# Patient Record
Sex: FEMALE | Race: WHITE | NOT HISPANIC OR LATINO | URBAN - METROPOLITAN AREA
[De-identification: names, ages, dates, MRNs, and addresses within clinical notes are randomized per-mention and may not be internally consistent; named-entity substitution may affect disease eponyms.]

---

## 2017-02-26 ENCOUNTER — EMERGENCY (EMERGENCY)
Facility: HOSPITAL | Age: 82
LOS: 1 days | End: 2017-02-26
Admitting: EMERGENCY MEDICINE
Payer: MEDICARE

## 2017-02-26 PROCEDURE — 99282 EMERGENCY DEPT VISIT SF MDM: CPT

## 2017-02-26 RX ORDER — NAFTIFINE HYDROCHLORIDE 10 MG/G
1 GEL TOPICAL
Qty: 1 | Refills: 0 | OUTPATIENT
Start: 2017-02-26 | End: 2017-03-12

## 2018-07-22 ENCOUNTER — INPATIENT (INPATIENT)
Facility: HOSPITAL | Age: 83
LOS: 4 days | Discharge: HOME HEALTH | End: 2018-07-27
Attending: FAMILY MEDICINE | Admitting: INTERNAL MEDICINE
Payer: MEDICARE

## 2018-07-22 VITALS
WEIGHT: 147.27 LBS | RESPIRATION RATE: 20 BRPM | HEART RATE: 71 BPM | SYSTOLIC BLOOD PRESSURE: 129 MMHG | OXYGEN SATURATION: 96 % | DIASTOLIC BLOOD PRESSURE: 73 MMHG | TEMPERATURE: 100 F | HEIGHT: 63 IN

## 2018-07-22 LAB
ALBUMIN SERPL ELPH-MCNC: 3.3 G/DL — SIGNIFICANT CHANGE UP (ref 3.3–5)
ALP SERPL-CCNC: 108 U/L — SIGNIFICANT CHANGE UP (ref 40–120)
ALT FLD-CCNC: 69 U/L — SIGNIFICANT CHANGE UP (ref 12–78)
ANION GAP SERPL CALC-SCNC: 12 MMOL/L — SIGNIFICANT CHANGE UP (ref 5–17)
APPEARANCE UR: CLEAR — SIGNIFICANT CHANGE UP
APTT BLD: 26.4 SEC — LOW (ref 27.5–37.4)
AST SERPL-CCNC: 72 U/L — HIGH (ref 15–37)
BACTERIA # UR AUTO: ABNORMAL
BASE EXCESS BLDV CALC-SCNC: -7.2 MMOL/L — LOW (ref -2–2)
BASOPHILS # BLD AUTO: 0 K/UL — SIGNIFICANT CHANGE UP (ref 0–0.2)
BASOPHILS NFR BLD AUTO: 0 % — SIGNIFICANT CHANGE UP (ref 0–2)
BILIRUB SERPL-MCNC: 1.3 MG/DL — HIGH (ref 0.2–1.2)
BILIRUB UR-MCNC: ABNORMAL
BUN SERPL-MCNC: 44 MG/DL — HIGH (ref 7–23)
CALCIUM SERPL-MCNC: 8.4 MG/DL — LOW (ref 8.5–10.1)
CHLORIDE SERPL-SCNC: 105 MMOL/L — SIGNIFICANT CHANGE UP (ref 96–108)
CO2 SERPL-SCNC: 21 MMOL/L — LOW (ref 22–31)
COLOR SPEC: YELLOW — SIGNIFICANT CHANGE UP
COMMENT - URINE: SIGNIFICANT CHANGE UP
CREAT SERPL-MCNC: 1.47 MG/DL — HIGH (ref 0.5–1.3)
DIFF PNL FLD: ABNORMAL
DOHLE BOD BLD QL SMEAR: PRESENT — SIGNIFICANT CHANGE UP
EOSINOPHIL # BLD AUTO: 0 K/UL — SIGNIFICANT CHANGE UP (ref 0–0.5)
EOSINOPHIL NFR BLD AUTO: 0 % — SIGNIFICANT CHANGE UP (ref 0–6)
EPI CELLS # UR: ABNORMAL
GLUCOSE SERPL-MCNC: 71 MG/DL — SIGNIFICANT CHANGE UP (ref 70–99)
GLUCOSE UR QL: NEGATIVE MG/DL — SIGNIFICANT CHANGE UP
HCO3 BLDV-SCNC: 17 MMOL/L — LOW (ref 21–29)
HCT VFR BLD CALC: 32.7 % — LOW (ref 34.5–45)
HGB BLD-MCNC: 10.9 G/DL — LOW (ref 11.5–15.5)
INR BLD: 1.38 RATIO — HIGH (ref 0.88–1.16)
KETONES UR-MCNC: NEGATIVE — SIGNIFICANT CHANGE UP
LACTATE SERPL-SCNC: 1.6 MMOL/L — SIGNIFICANT CHANGE UP (ref 0.7–2)
LEUKOCYTE ESTERASE UR-ACNC: ABNORMAL
LG PLATELETS BLD QL AUTO: SLIGHT — SIGNIFICANT CHANGE UP
LYMPHOCYTES # BLD AUTO: 1.72 K/UL — SIGNIFICANT CHANGE UP (ref 1–3.3)
LYMPHOCYTES # BLD AUTO: 10 % — LOW (ref 13–44)
MACROCYTES BLD QL: SLIGHT — SIGNIFICANT CHANGE UP
MANUAL SMEAR VERIFICATION: SIGNIFICANT CHANGE UP
MCHC RBC-ENTMCNC: 32.6 PG — SIGNIFICANT CHANGE UP (ref 27–34)
MCHC RBC-ENTMCNC: 33.3 GM/DL — SIGNIFICANT CHANGE UP (ref 32–36)
MCV RBC AUTO: 97.9 FL — SIGNIFICANT CHANGE UP (ref 80–100)
METAMYELOCYTES # FLD: 2 % — HIGH (ref 0–0)
MONOCYTES # BLD AUTO: 0.86 K/UL — SIGNIFICANT CHANGE UP (ref 0–0.9)
MONOCYTES NFR BLD AUTO: 5 % — SIGNIFICANT CHANGE UP (ref 2–14)
MYELOCYTES NFR BLD: 2 % — HIGH (ref 0–0)
NEUTROPHILS # BLD AUTO: 13.39 K/UL — HIGH (ref 1.8–7.4)
NEUTROPHILS NFR BLD AUTO: 74 % — SIGNIFICANT CHANGE UP (ref 43–77)
NEUTS BAND # BLD: 4 % — SIGNIFICANT CHANGE UP (ref 0–8)
NITRITE UR-MCNC: NEGATIVE — SIGNIFICANT CHANGE UP
NRBC # BLD: 0 /100 — SIGNIFICANT CHANGE UP (ref 0–0)
NRBC # BLD: SIGNIFICANT CHANGE UP /100 WBCS (ref 0–0)
PCO2 BLDV: 34 MMHG — LOW (ref 35–50)
PH BLDV: 7.33 — LOW (ref 7.35–7.45)
PH UR: 5 — SIGNIFICANT CHANGE UP (ref 5–8)
PLAT MORPH BLD: NORMAL — SIGNIFICANT CHANGE UP
PLATELET # BLD AUTO: 192 K/UL — SIGNIFICANT CHANGE UP (ref 150–400)
PO2 BLDV: 157 MMHG — HIGH (ref 25–45)
POTASSIUM SERPL-MCNC: 3.3 MMOL/L — LOW (ref 3.5–5.3)
POTASSIUM SERPL-SCNC: 3.3 MMOL/L — LOW (ref 3.5–5.3)
PROT SERPL-MCNC: 8.3 GM/DL — SIGNIFICANT CHANGE UP (ref 6–8.3)
PROT UR-MCNC: 30 MG/DL
PROTHROM AB SERPL-ACNC: 15 SEC — HIGH (ref 9.8–12.7)
RAPID RVP RESULT: SIGNIFICANT CHANGE UP
RBC # BLD: 3.34 M/UL — LOW (ref 3.8–5.2)
RBC # FLD: 14.1 % — SIGNIFICANT CHANGE UP (ref 10.3–14.5)
RBC BLD AUTO: SIGNIFICANT CHANGE UP
RBC CASTS # UR COMP ASSIST: ABNORMAL /HPF (ref 0–4)
SAO2 % BLDV: 99 % — HIGH (ref 67–88)
SODIUM SERPL-SCNC: 138 MMOL/L — SIGNIFICANT CHANGE UP (ref 135–145)
SP GR SPEC: 1.02 — SIGNIFICANT CHANGE UP (ref 1.01–1.02)
UROBILINOGEN FLD QL: 1 MG/DL
VARIANT LYMPHS # BLD: 3 % — SIGNIFICANT CHANGE UP (ref 0–6)
WBC # BLD: 17.17 K/UL — HIGH (ref 3.8–10.5)
WBC # FLD AUTO: 17.17 K/UL — HIGH (ref 3.8–10.5)
WBC UR QL: ABNORMAL

## 2018-07-22 PROCEDURE — 93010 ELECTROCARDIOGRAM REPORT: CPT

## 2018-07-22 PROCEDURE — 99291 CRITICAL CARE FIRST HOUR: CPT

## 2018-07-22 PROCEDURE — 71045 X-RAY EXAM CHEST 1 VIEW: CPT | Mod: 26

## 2018-07-22 PROCEDURE — 70450 CT HEAD/BRAIN W/O DYE: CPT | Mod: 26

## 2018-07-22 RX ORDER — REPAGLINIDE 1 MG/1
1 TABLET ORAL
Qty: 0 | Refills: 0 | COMMUNITY

## 2018-07-22 RX ORDER — CHOLECALCIFEROL (VITAMIN D3) 125 MCG
1 CAPSULE ORAL
Qty: 0 | Refills: 0 | COMMUNITY

## 2018-07-22 RX ORDER — ATORVASTATIN CALCIUM 80 MG/1
1 TABLET, FILM COATED ORAL
Qty: 0 | Refills: 0 | COMMUNITY

## 2018-07-22 RX ORDER — DONEPEZIL HYDROCHLORIDE 10 MG/1
1 TABLET, FILM COATED ORAL
Qty: 0 | Refills: 0 | COMMUNITY

## 2018-07-22 RX ORDER — RISPERIDONE 4 MG/1
1 TABLET ORAL
Qty: 0 | Refills: 0 | COMMUNITY

## 2018-07-22 RX ORDER — DOCUSATE SODIUM 100 MG
1 CAPSULE ORAL
Qty: 0 | Refills: 0 | COMMUNITY

## 2018-07-22 RX ORDER — ACETAMINOPHEN 500 MG
1000 TABLET ORAL ONCE
Qty: 0 | Refills: 0 | Status: COMPLETED | OUTPATIENT
Start: 2018-07-22 | End: 2018-07-22

## 2018-07-22 RX ORDER — LATANOPROST 0.05 MG/ML
1 SOLUTION/ DROPS OPHTHALMIC; TOPICAL
Qty: 0 | Refills: 0 | COMMUNITY

## 2018-07-22 RX ORDER — AZTREONAM 2 G
1000 VIAL (EA) INJECTION ONCE
Qty: 0 | Refills: 0 | Status: COMPLETED | OUTPATIENT
Start: 2018-07-22 | End: 2018-07-22

## 2018-07-22 RX ORDER — SERTRALINE 25 MG/1
1 TABLET, FILM COATED ORAL
Qty: 0 | Refills: 0 | COMMUNITY

## 2018-07-22 RX ORDER — DILTIAZEM HCL 120 MG
1 CAPSULE, EXT RELEASE 24 HR ORAL
Qty: 0 | Refills: 0 | COMMUNITY

## 2018-07-22 RX ORDER — SODIUM CHLORIDE 9 MG/ML
2000 INJECTION INTRAMUSCULAR; INTRAVENOUS; SUBCUTANEOUS ONCE
Qty: 0 | Refills: 0 | Status: COMPLETED | OUTPATIENT
Start: 2018-07-22 | End: 2018-07-22

## 2018-07-22 RX ORDER — ASPIRIN/CALCIUM CARB/MAGNESIUM 324 MG
1 TABLET ORAL
Qty: 0 | Refills: 0 | COMMUNITY

## 2018-07-22 RX ORDER — MEMANTINE HYDROCHLORIDE 10 MG/1
1 TABLET ORAL
Qty: 0 | Refills: 0 | COMMUNITY

## 2018-07-22 RX ORDER — VANCOMYCIN HCL 1 G
1000 VIAL (EA) INTRAVENOUS ONCE
Qty: 0 | Refills: 0 | Status: COMPLETED | OUTPATIENT
Start: 2018-07-22 | End: 2018-07-22

## 2018-07-22 RX ORDER — ACETAMINOPHEN 500 MG
2 TABLET ORAL
Qty: 0 | Refills: 0 | COMMUNITY

## 2018-07-22 RX ORDER — LEVOTHYROXINE SODIUM 125 MCG
1 TABLET ORAL
Qty: 0 | Refills: 0 | COMMUNITY

## 2018-07-22 RX ADMIN — SODIUM CHLORIDE 2000 MILLILITER(S): 9 INJECTION INTRAMUSCULAR; INTRAVENOUS; SUBCUTANEOUS at 15:26

## 2018-07-22 RX ADMIN — Medication 250 MILLIGRAM(S): at 15:37

## 2018-07-22 RX ADMIN — Medication 400 MILLIGRAM(S): at 15:25

## 2018-07-22 RX ADMIN — Medication 50 MILLIGRAM(S): at 17:50

## 2018-07-22 NOTE — ED PROVIDER NOTE - CARE PLAN
Principal Discharge DX:	Pneumonia  Secondary Diagnosis:	Altered mental status  Secondary Diagnosis:	Sepsis

## 2018-07-22 NOTE — H&P ADULT - FAMILY HISTORY
No pertinent family history in first degree relatives Mother  Still living? Unknown  Family history of CHF (congestive heart failure), Age at diagnosis: Age Unknown     Father  Still living? Unknown  Family history of CKD (chronic kidney disease), Age at diagnosis: Age Unknown

## 2018-07-22 NOTE — ED PROVIDER NOTE - PMH
Dementia    Diabetes    Dyslipidemia    Glaucoma    Hypertension    MI (myocardial infarction)    Skin cancer  recent Sx Lt shoulder  TIA (transient ischemic attack)

## 2018-07-22 NOTE — H&P ADULT - ASSESSMENT
89 y/o F PMHx significant for dementia, TIA, HTN, diabetes mellitus type 2, hyperlipidemia, who was BIBA from [facility] for further evaluation of increased confusion which began last night (7/21) associated with cough, and low grade fever today (7/22). At [facility] the patient was found to be increasingly dyspneic and hypoxic (per EMS SpO2 90% PTA). Labs => WBC 17.17, Hgb/Hct 10.9/32.7, K 3.3, BUN/Cr 44/1.47. CT Head => moderate anterior periventricular white matter ischemia. CXR revealed bilateral lower lobe infiltrates. In the ED the patient was found to be in AFib w/ RVR. 89 y/o F PMHx significant for dementia, TIA, HTN, diabetes mellitus type 2, hyperlipidemia, who was BIBA from [facility] for further evaluation of increased confusion which began last night (7/21) associated with cough, and low grade fever today (7/22). At [facility] the patient was found to be increasingly dyspneic and hypoxic (per EMS SpO2 90% PTA). Labs => WBC 17.17, Hgb/Hct 10.9/32.7, K 3.3, BUN/Cr 44/1.47. CT Head => moderate anterior periventricular white matter ischemia. CXR revealed bilateral lower lobe infiltrates. In the ED the patient was found to be in AFib w/ RVR.    1)Sepsis due to HCAP  ~admit to CICU  ~cont. broad spectrum IV abx  ~f/u PAN C+S  ~f/u w/ ID consultation in the am  ~cont. supplemental O2  ~cont. anti-pyretics prn  ~cont. anti-tussives prn    2)AFib w/ RVR  ~cont. rate control  ~given LMWH @ 1mg/kg q24h (2/2 renal fxn)  ~serial Rod/EKGs  ~f/u w/ Cardiology in the am    3)CAD/HTN  ~cont. ASA 81mg po daily  ~cont. Diltiazem when appropriate    4)Hyperlipidemia  ~cont. statin therapy     5)Dementia  ~cont. Donepezil 10mg po qHS  ~cont. Memantine 10mg po bid    6)Advance Care Planning  ~advance directives were discussed extensively at the patient's bedside. The patient wishes at this time are to be both DNR. The following medical interventions are agreeable to the patient including use of IV fluids, and antibiotics.   Total ACP time spent; 16 minutes    7)Vte ppx  ~cont. LMWH as above 89 y/o F PMHx significant for dementia, TIA, HTN, diabetes mellitus type 2, hyperlipidemia, who was BIBA from [facility] for further evaluation of increased confusion which began last night (7/21) associated with cough, and low grade fever today (7/22). At [facility] the patient was found to be increasingly dyspneic and hypoxic (per EMS SpO2 90% PTA). Labs => WBC 17.17, Hgb/Hct 10.9/32.7, K 3.3, BUN/Cr 44/1.47. CT Head => moderate anterior periventricular white matter ischemia. CXR revealed bilateral lower lobe infiltrates. In the ED the patient was found to be in AFib w/ RVR.    1)Sepsis due to HCAP  ~admit to CICU  ~cont. broad spectrum IV abx  ~f/u PAN C+S  ~f/u w/ ID consultation in the am  ~cont. supplemental O2  ~cont. anti-pyretics prn  ~cont. anti-tussives prn    2)AFib w/ RVR  ~cont. rate control  ~given LMWH @ 1mg/kg q24h (2/2 renal fxn)  ~serial Rod/EKGs  ~f/u w/ Cardiology in the am    3)CAD/HTN  ~cont. ASA 81mg po daily  ~cont. Diltiazem when appropriate    4)Hyperlipidemia  ~cont. statin therapy     5)Dementia  ~cont. Donepezil 10mg po qHS  ~cont. Memantine 10mg po bid    6)Advance Care Planning  ~advance directives were discussed extensively at the patient's bedside. The patient wishes at this time are to be both DNR. The following medical interventions are agreeable to the patient including use of IV fluids, and antibiotics.   Total ACP time spent; 16 minutes    7)Vte ppx  ~cont. LMWH as above    Total Critical Care Time; 100 minutes.

## 2018-07-22 NOTE — ED ADULT NURSE NOTE - OBJECTIVE STATEMENT
pt brought by EMS from SNF for eval of fever and lethargy since today. pt awake denies pain or diff breathing at this time c/o productive cough.

## 2018-07-22 NOTE — ED PROVIDER NOTE - PROGRESS NOTE DETAILS
EZIO PT septic.  No sign of septic shock.  Lactate 1.6.   Pt Tx according to protocol.  Source likely urinary.  Given presentation and findings, patient requires hospitalization likely requiring 4 days of inpatient care.  Patient signed out to Dr. Vásquez.  Patient's history, vital signs, lab results, imaging, pertinent findings, and clinical condition reviewed during sign out.  At sign out patient admitted in hemodynamically stable condition in no acute distress.

## 2018-07-22 NOTE — ED PROVIDER NOTE - OBJECTIVE STATEMENT
89 y/o female with a PMHx of dementia, dyslipidemia, DM, CAD, glaucoma, HTN, MI, skin CA, TIA presents to the ED c/o fever and cough starting last night. As per family, she noticed the pt with an altered mental status last night, describes the pt as being "incoherent" and had onset of cough, worsening today. Pt had low grade fever today. As per EMS, pt was 90% on room air PTA, now improving. Denies recent fall. Denies ETOH, drug or tobacco use. On baby ASA. Allergy to Penicillin and Sulfa. Full hx unobtainable due to dementia,   spesis, pulmoarny soue,ce evaulated according to spesis protocol   crysat shalini, droway appearing 89 y/o female with a PMHx of dementia, dyslipidemia, DM, CAD, glaucoma, HTN, MI, skin CA, TIA presents to the ED c/o fever and cough starting last night. As per family, she noticed the pt with an altered mental status last night, describes the pt as being "incoherent" and had onset of cough, worsening today. Pt had low grade fever today. As per EMS, pt was 90% on room air PTA, now improving. Denies recent fall. Denies ETOH, drug or tobacco use. On baby ASA. Allergy to Penicillin and Sulfa. Full hx unobtainable due to dementia.

## 2018-07-22 NOTE — ED PROVIDER NOTE - MEDICAL DECISION MAKING DETAILS
89 y/o female presents spesis, pulmoardenis martin,ce evaulated according to spesis protocol 89 y/o female presents with hx of dementia, DM, CAD, skin CA, MI presents with fever, cough and AMS starting last night. On exam, pt noted to have bilateral rhonchi. Plan for sepsis workup, r/o pulmonary source, CXR, CT head, EKG, evaluated according to speiss protocol.

## 2018-07-22 NOTE — ED PROVIDER NOTE - FAMILY HISTORY
Mother  Still living? Unknown  Family history of CHF (congestive heart failure), Age at diagnosis: Age Unknown     Father  Still living? Unknown  Family history of CKD (chronic kidney disease), Age at diagnosis: Age Unknown

## 2018-07-22 NOTE — H&P ADULT - NSHPPHYSICALEXAM_GEN_ALL_CORE
Vital Signs Last 24 Hrs  T(C): 36.7 (22 Jul 2018 19:43), Max: 38.1 (22 Jul 2018 15:22)  T(F): 98.1 (22 Jul 2018 19:43), Max: 100.5 (22 Jul 2018 15:22)  HR: 94 (22 Jul 2018 19:43) (61 - 94)  BP: 102/61 (22 Jul 2018 19:43) (102/61 - 129/73)  RR: 22 (22 Jul 2018 19:43) (20 - 22)  SpO2: 94% (22 Jul 2018 19:43) (94% - 97%)

## 2018-07-22 NOTE — H&P ADULT - HISTORY OF PRESENT ILLNESS
89 y/o F PMHx significant for dementia, TIA, HTN, diabetes mellitus type 2, hyperlipidemia, who was BIBA from [facility] for further evaluation of increased confusion which began last night (7/21) associated with cough, and low grade fever today (7/22). At [facility] the patient was found to be increasingly dyspneic and hypoxic (per EMS SpO2 90% PTA). Labs => WBC 17.17, Hgb/Hct 10.9/32.7, K 3.3, BUN/Cr 44/1.47. CT Head => moderate anterior periventricular white matter ischemia. CXR revealed bilateral lower lobe infiltrates. In the ED the patient was found to be in AFib w/ RVR.

## 2018-07-22 NOTE — ED PROVIDER NOTE - CONSTITUTIONAL, MLM
normal... Well appearing, well nourished, awake, alert, oriented to person, place, time/situation and in no apparent distress. Drowsy appearing, well nourished, awake, alert, and in no apparent distress.

## 2018-07-22 NOTE — ED ADULT NURSE REASSESSMENT NOTE - COMFORT CARE
repositioned/wait time explained/assisted to bedpan/side rails up/warm blanket provided/linens changed/plan of care explained

## 2018-07-22 NOTE — H&P ADULT - PMH
Dementia    Diabetes    Dyslipidemia    Glaucoma    Hypertension    MI (myocardial infarction)    Skin cancer  recent Sx Lt shoulder  TIA (transient ischemic attack) Dementia    Diabetes  Type 2  Dyslipidemia    Glaucoma    Hypertension    MI (myocardial infarction)    Skin cancer  recent Sx Lt shoulder  TIA (transient ischemic attack)

## 2018-07-22 NOTE — ED PROVIDER NOTE - NS_ ATTENDINGSCRIBEDETAILS _ED_A_ED_FT
The scribe's documentation has been prepared under my direction and personally reviewed by me in its entirety.  I confirm that the note above accurately reflects all my work, treatment, procedures, and decision making except where otherwise noted or amended by me.  Дмитрий Ham M.D.

## 2018-07-23 LAB
ADD ON TEST-SPECIMEN IN LAB: SIGNIFICANT CHANGE UP
ALBUMIN SERPL ELPH-MCNC: 2.5 G/DL — LOW (ref 3.3–5)
ALP SERPL-CCNC: 107 U/L — SIGNIFICANT CHANGE UP (ref 40–120)
ALT FLD-CCNC: 68 U/L — SIGNIFICANT CHANGE UP (ref 12–78)
ANION GAP SERPL CALC-SCNC: 9 MMOL/L — SIGNIFICANT CHANGE UP (ref 5–17)
AST SERPL-CCNC: 79 U/L — HIGH (ref 15–37)
BASOPHILS # BLD AUTO: 0 K/UL — SIGNIFICANT CHANGE UP (ref 0–0.2)
BASOPHILS NFR BLD AUTO: 0 % — SIGNIFICANT CHANGE UP (ref 0–2)
BILIRUB SERPL-MCNC: 1.1 MG/DL — SIGNIFICANT CHANGE UP (ref 0.2–1.2)
BUN SERPL-MCNC: 32 MG/DL — HIGH (ref 7–23)
CALCIUM SERPL-MCNC: 7.5 MG/DL — LOW (ref 8.5–10.1)
CHLORIDE SERPL-SCNC: 109 MMOL/L — HIGH (ref 96–108)
CO2 SERPL-SCNC: 19 MMOL/L — LOW (ref 22–31)
CREAT SERPL-MCNC: 0.98 MG/DL — SIGNIFICANT CHANGE UP (ref 0.5–1.3)
CULTURE RESULTS: SIGNIFICANT CHANGE UP
EOSINOPHIL # BLD AUTO: 0 K/UL — SIGNIFICANT CHANGE UP (ref 0–0.5)
EOSINOPHIL NFR BLD AUTO: 0 % — SIGNIFICANT CHANGE UP (ref 0–6)
GLUCOSE BLDC GLUCOMTR-MCNC: 103 MG/DL — HIGH (ref 70–99)
GLUCOSE BLDC GLUCOMTR-MCNC: 114 MG/DL — HIGH (ref 70–99)
GLUCOSE BLDC GLUCOMTR-MCNC: 80 MG/DL — SIGNIFICANT CHANGE UP (ref 70–99)
GLUCOSE BLDC GLUCOMTR-MCNC: 88 MG/DL — SIGNIFICANT CHANGE UP (ref 70–99)
GLUCOSE SERPL-MCNC: 77 MG/DL — SIGNIFICANT CHANGE UP (ref 70–99)
HBA1C BLD-MCNC: 6.3 % — HIGH (ref 4–5.6)
HCT VFR BLD CALC: 27.6 % — LOW (ref 34.5–45)
HGB BLD-MCNC: 9.2 G/DL — LOW (ref 11.5–15.5)
INR BLD: 1.61 RATIO — HIGH (ref 0.88–1.16)
LACTATE SERPL-SCNC: 0.8 MMOL/L — SIGNIFICANT CHANGE UP (ref 0.7–2)
LYMPHOCYTES # BLD AUTO: 1.29 K/UL — SIGNIFICANT CHANGE UP (ref 1–3.3)
LYMPHOCYTES # BLD AUTO: 11 % — LOW (ref 13–44)
MAGNESIUM SERPL-MCNC: 2.2 MG/DL — SIGNIFICANT CHANGE UP (ref 1.6–2.6)
MAGNESIUM SERPL-MCNC: 2.2 MG/DL — SIGNIFICANT CHANGE UP (ref 1.6–2.6)
MANUAL SMEAR VERIFICATION: SIGNIFICANT CHANGE UP
MCHC RBC-ENTMCNC: 32.4 PG — SIGNIFICANT CHANGE UP (ref 27–34)
MCHC RBC-ENTMCNC: 33.3 GM/DL — SIGNIFICANT CHANGE UP (ref 32–36)
MCV RBC AUTO: 97.2 FL — SIGNIFICANT CHANGE UP (ref 80–100)
MONOCYTES # BLD AUTO: 0.7 K/UL — SIGNIFICANT CHANGE UP (ref 0–0.9)
MONOCYTES NFR BLD AUTO: 6 % — SIGNIFICANT CHANGE UP (ref 2–14)
NEUTROPHILS # BLD AUTO: 9.7 K/UL — HIGH (ref 1.8–7.4)
NEUTROPHILS NFR BLD AUTO: 83 % — HIGH (ref 43–77)
NRBC # BLD: 0 /100 — SIGNIFICANT CHANGE UP (ref 0–0)
NRBC # BLD: SIGNIFICANT CHANGE UP /100 WBCS (ref 0–0)
PLAT MORPH BLD: NORMAL — SIGNIFICANT CHANGE UP
PLATELET # BLD AUTO: 176 K/UL — SIGNIFICANT CHANGE UP (ref 150–400)
POTASSIUM SERPL-MCNC: 3.6 MMOL/L — SIGNIFICANT CHANGE UP (ref 3.5–5.3)
POTASSIUM SERPL-SCNC: 3.6 MMOL/L — SIGNIFICANT CHANGE UP (ref 3.5–5.3)
PROT SERPL-MCNC: 6.5 GM/DL — SIGNIFICANT CHANGE UP (ref 6–8.3)
PROTHROM AB SERPL-ACNC: 17.6 SEC — HIGH (ref 9.8–12.7)
RBC # BLD: 2.84 M/UL — LOW (ref 3.8–5.2)
RBC # FLD: 14.1 % — SIGNIFICANT CHANGE UP (ref 10.3–14.5)
RBC BLD AUTO: SIGNIFICANT CHANGE UP
SODIUM SERPL-SCNC: 137 MMOL/L — SIGNIFICANT CHANGE UP (ref 135–145)
SPECIMEN SOURCE: SIGNIFICANT CHANGE UP
TROPONIN I SERPL-MCNC: 0.05 NG/ML — HIGH (ref 0.01–0.04)
TROPONIN I SERPL-MCNC: 0.05 NG/ML — HIGH (ref 0.01–0.04)
TSH SERPL-MCNC: 0.82 UIU/ML — SIGNIFICANT CHANGE UP (ref 0.36–3.74)
WBC # BLD: 11.69 K/UL — HIGH (ref 3.8–10.5)
WBC # FLD AUTO: 11.69 K/UL — HIGH (ref 3.8–10.5)

## 2018-07-23 PROCEDURE — 93010 ELECTROCARDIOGRAM REPORT: CPT

## 2018-07-23 PROCEDURE — 71045 X-RAY EXAM CHEST 1 VIEW: CPT | Mod: 26

## 2018-07-23 PROCEDURE — 93306 TTE W/DOPPLER COMPLETE: CPT | Mod: 26

## 2018-07-23 RX ORDER — ONDANSETRON 8 MG/1
4 TABLET, FILM COATED ORAL EVERY 6 HOURS
Qty: 0 | Refills: 0 | Status: DISCONTINUED | OUTPATIENT
Start: 2018-07-23 | End: 2018-07-27

## 2018-07-23 RX ORDER — DEXTROSE 50 % IN WATER 50 %
15 SYRINGE (ML) INTRAVENOUS ONCE
Qty: 0 | Refills: 0 | Status: DISCONTINUED | OUTPATIENT
Start: 2018-07-23 | End: 2018-07-27

## 2018-07-23 RX ORDER — WARFARIN SODIUM 2.5 MG/1
3 TABLET ORAL ONCE
Qty: 0 | Refills: 0 | Status: COMPLETED | OUTPATIENT
Start: 2018-07-23 | End: 2018-07-23

## 2018-07-23 RX ORDER — ASPIRIN/CALCIUM CARB/MAGNESIUM 324 MG
81 TABLET ORAL DAILY
Qty: 0 | Refills: 0 | Status: DISCONTINUED | OUTPATIENT
Start: 2018-07-23 | End: 2018-07-27

## 2018-07-23 RX ORDER — WARFARIN SODIUM 2.5 MG/1
5 TABLET ORAL DAILY
Qty: 0 | Refills: 0 | Status: DISCONTINUED | OUTPATIENT
Start: 2018-07-23 | End: 2018-07-23

## 2018-07-23 RX ORDER — SENNA PLUS 8.6 MG/1
2 TABLET ORAL AT BEDTIME
Qty: 0 | Refills: 0 | Status: DISCONTINUED | OUTPATIENT
Start: 2018-07-23 | End: 2018-07-27

## 2018-07-23 RX ORDER — SODIUM CHLORIDE 9 MG/ML
1000 INJECTION INTRAMUSCULAR; INTRAVENOUS; SUBCUTANEOUS
Qty: 0 | Refills: 0 | Status: DISCONTINUED | OUTPATIENT
Start: 2018-07-23 | End: 2018-07-24

## 2018-07-23 RX ORDER — SODIUM CHLORIDE 9 MG/ML
1000 INJECTION, SOLUTION INTRAVENOUS
Qty: 0 | Refills: 0 | Status: DISCONTINUED | OUTPATIENT
Start: 2018-07-23 | End: 2018-07-27

## 2018-07-23 RX ORDER — DONEPEZIL HYDROCHLORIDE 10 MG/1
10 TABLET, FILM COATED ORAL AT BEDTIME
Qty: 0 | Refills: 0 | Status: DISCONTINUED | OUTPATIENT
Start: 2018-07-23 | End: 2018-07-27

## 2018-07-23 RX ORDER — DOCUSATE SODIUM 100 MG
100 CAPSULE ORAL THREE TIMES A DAY
Qty: 0 | Refills: 0 | Status: DISCONTINUED | OUTPATIENT
Start: 2018-07-23 | End: 2018-07-27

## 2018-07-23 RX ORDER — HEPARIN SODIUM 5000 [USP'U]/ML
5500 INJECTION INTRAVENOUS; SUBCUTANEOUS EVERY 6 HOURS
Qty: 0 | Refills: 0 | Status: DISCONTINUED | OUTPATIENT
Start: 2018-07-23 | End: 2018-07-23

## 2018-07-23 RX ORDER — LEVOTHYROXINE SODIUM 125 MCG
25 TABLET ORAL DAILY
Qty: 0 | Refills: 0 | Status: DISCONTINUED | OUTPATIENT
Start: 2018-07-23 | End: 2018-07-27

## 2018-07-23 RX ORDER — AZTREONAM 2 G
VIAL (EA) INJECTION
Qty: 0 | Refills: 0 | Status: DISCONTINUED | OUTPATIENT
Start: 2018-07-23 | End: 2018-07-23

## 2018-07-23 RX ORDER — FUROSEMIDE 40 MG
40 TABLET ORAL ONCE
Qty: 0 | Refills: 0 | Status: COMPLETED | OUTPATIENT
Start: 2018-07-23 | End: 2018-07-23

## 2018-07-23 RX ORDER — HEPARIN SODIUM 5000 [USP'U]/ML
2500 INJECTION INTRAVENOUS; SUBCUTANEOUS EVERY 6 HOURS
Qty: 0 | Refills: 0 | Status: DISCONTINUED | OUTPATIENT
Start: 2018-07-23 | End: 2018-07-23

## 2018-07-23 RX ORDER — DEXTROSE 50 % IN WATER 50 %
12.5 SYRINGE (ML) INTRAVENOUS ONCE
Qty: 0 | Refills: 0 | Status: DISCONTINUED | OUTPATIENT
Start: 2018-07-23 | End: 2018-07-27

## 2018-07-23 RX ORDER — RISPERIDONE 4 MG/1
0.25 TABLET ORAL DAILY
Qty: 0 | Refills: 0 | Status: DISCONTINUED | OUTPATIENT
Start: 2018-07-23 | End: 2018-07-27

## 2018-07-23 RX ORDER — GLUCAGON INJECTION, SOLUTION 0.5 MG/.1ML
1 INJECTION, SOLUTION SUBCUTANEOUS ONCE
Qty: 0 | Refills: 0 | Status: DISCONTINUED | OUTPATIENT
Start: 2018-07-23 | End: 2018-07-27

## 2018-07-23 RX ORDER — ALBUTEROL 90 UG/1
2.5 AEROSOL, METERED ORAL EVERY 6 HOURS
Qty: 0 | Refills: 0 | Status: DISCONTINUED | OUTPATIENT
Start: 2018-07-23 | End: 2018-07-27

## 2018-07-23 RX ORDER — DILTIAZEM HCL 120 MG
120 CAPSULE, EXT RELEASE 24 HR ORAL DAILY
Qty: 0 | Refills: 0 | Status: DISCONTINUED | OUTPATIENT
Start: 2018-07-23 | End: 2018-07-27

## 2018-07-23 RX ORDER — HEPARIN SODIUM 5000 [USP'U]/ML
INJECTION INTRAVENOUS; SUBCUTANEOUS
Qty: 25000 | Refills: 0 | Status: DISCONTINUED | OUTPATIENT
Start: 2018-07-23 | End: 2018-07-23

## 2018-07-23 RX ORDER — ACETAMINOPHEN 500 MG
650 TABLET ORAL EVERY 6 HOURS
Qty: 0 | Refills: 0 | Status: DISCONTINUED | OUTPATIENT
Start: 2018-07-23 | End: 2018-07-27

## 2018-07-23 RX ORDER — CEFEPIME 1 G/1
1000 INJECTION, POWDER, FOR SOLUTION INTRAMUSCULAR; INTRAVENOUS EVERY 12 HOURS
Qty: 0 | Refills: 0 | Status: DISCONTINUED | OUTPATIENT
Start: 2018-07-23 | End: 2018-07-26

## 2018-07-23 RX ORDER — DEXTROSE 50 % IN WATER 50 %
25 SYRINGE (ML) INTRAVENOUS ONCE
Qty: 0 | Refills: 0 | Status: DISCONTINUED | OUTPATIENT
Start: 2018-07-23 | End: 2018-07-27

## 2018-07-23 RX ORDER — LATANOPROST 0.05 MG/ML
1 SOLUTION/ DROPS OPHTHALMIC; TOPICAL AT BEDTIME
Qty: 0 | Refills: 0 | Status: DISCONTINUED | OUTPATIENT
Start: 2018-07-23 | End: 2018-07-27

## 2018-07-23 RX ORDER — POTASSIUM CHLORIDE 20 MEQ
10 PACKET (EA) ORAL ONCE
Qty: 0 | Refills: 0 | Status: COMPLETED | OUTPATIENT
Start: 2018-07-23 | End: 2018-07-23

## 2018-07-23 RX ORDER — ACETAMINOPHEN 500 MG
1000 TABLET ORAL ONCE
Qty: 0 | Refills: 0 | Status: COMPLETED | OUTPATIENT
Start: 2018-07-23 | End: 2018-07-23

## 2018-07-23 RX ORDER — SERTRALINE 25 MG/1
100 TABLET, FILM COATED ORAL DAILY
Qty: 0 | Refills: 0 | Status: DISCONTINUED | OUTPATIENT
Start: 2018-07-23 | End: 2018-07-27

## 2018-07-23 RX ORDER — CHOLECALCIFEROL (VITAMIN D3) 125 MCG
1000 CAPSULE ORAL DAILY
Qty: 0 | Refills: 0 | Status: DISCONTINUED | OUTPATIENT
Start: 2018-07-23 | End: 2018-07-27

## 2018-07-23 RX ORDER — HEPARIN SODIUM 5000 [USP'U]/ML
5500 INJECTION INTRAVENOUS; SUBCUTANEOUS ONCE
Qty: 0 | Refills: 0 | Status: DISCONTINUED | OUTPATIENT
Start: 2018-07-23 | End: 2018-07-23

## 2018-07-23 RX ORDER — MEMANTINE HYDROCHLORIDE 10 MG/1
10 TABLET ORAL
Qty: 0 | Refills: 0 | Status: DISCONTINUED | OUTPATIENT
Start: 2018-07-23 | End: 2018-07-27

## 2018-07-23 RX ORDER — ATORVASTATIN CALCIUM 80 MG/1
10 TABLET, FILM COATED ORAL AT BEDTIME
Qty: 0 | Refills: 0 | Status: DISCONTINUED | OUTPATIENT
Start: 2018-07-23 | End: 2018-07-27

## 2018-07-23 RX ORDER — RISPERIDONE 4 MG/1
0.5 TABLET ORAL AT BEDTIME
Qty: 0 | Refills: 0 | Status: DISCONTINUED | OUTPATIENT
Start: 2018-07-23 | End: 2018-07-27

## 2018-07-23 RX ORDER — ENOXAPARIN SODIUM 100 MG/ML
60 INJECTION SUBCUTANEOUS EVERY 24 HOURS
Qty: 0 | Refills: 0 | Status: DISCONTINUED | OUTPATIENT
Start: 2018-07-23 | End: 2018-07-27

## 2018-07-23 RX ORDER — AZTREONAM 2 G
1000 VIAL (EA) INJECTION ONCE
Qty: 0 | Refills: 0 | Status: COMPLETED | OUTPATIENT
Start: 2018-07-23 | End: 2018-07-23

## 2018-07-23 RX ORDER — INSULIN LISPRO 100/ML
VIAL (ML) SUBCUTANEOUS
Qty: 0 | Refills: 0 | Status: DISCONTINUED | OUTPATIENT
Start: 2018-07-23 | End: 2018-07-27

## 2018-07-23 RX ORDER — AZTREONAM 2 G
1000 VIAL (EA) INJECTION EVERY 8 HOURS
Qty: 0 | Refills: 0 | Status: DISCONTINUED | OUTPATIENT
Start: 2018-07-23 | End: 2018-07-23

## 2018-07-23 RX ADMIN — WARFARIN SODIUM 3 MILLIGRAM(S): 2.5 TABLET ORAL at 21:30

## 2018-07-23 RX ADMIN — RISPERIDONE 0.5 MILLIGRAM(S): 4 TABLET ORAL at 21:31

## 2018-07-23 RX ADMIN — Medication 50 MILLIGRAM(S): at 03:44

## 2018-07-23 RX ADMIN — ATORVASTATIN CALCIUM 10 MILLIGRAM(S): 80 TABLET, FILM COATED ORAL at 21:30

## 2018-07-23 RX ADMIN — Medication 1000 UNIT(S): at 12:25

## 2018-07-23 RX ADMIN — Medication 120 MILLIGRAM(S): at 10:05

## 2018-07-23 RX ADMIN — MEMANTINE HYDROCHLORIDE 10 MILLIGRAM(S): 10 TABLET ORAL at 06:14

## 2018-07-23 RX ADMIN — Medication 100 MILLIGRAM(S): at 17:14

## 2018-07-23 RX ADMIN — Medication 200 MILLIGRAM(S): at 10:09

## 2018-07-23 RX ADMIN — RISPERIDONE 0.25 MILLIGRAM(S): 4 TABLET ORAL at 12:25

## 2018-07-23 RX ADMIN — DONEPEZIL HYDROCHLORIDE 10 MILLIGRAM(S): 10 TABLET, FILM COATED ORAL at 21:30

## 2018-07-23 RX ADMIN — Medication 1000 MILLIGRAM(S): at 01:33

## 2018-07-23 RX ADMIN — Medication 40 MILLIGRAM(S): at 17:13

## 2018-07-23 RX ADMIN — LATANOPROST 1 DROP(S): 0.05 SOLUTION/ DROPS OPHTHALMIC; TOPICAL at 22:24

## 2018-07-23 RX ADMIN — Medication 25 MICROGRAM(S): at 06:14

## 2018-07-23 RX ADMIN — Medication 200 MILLIGRAM(S): at 01:23

## 2018-07-23 RX ADMIN — SODIUM CHLORIDE 150 MILLILITER(S): 9 INJECTION INTRAMUSCULAR; INTRAVENOUS; SUBCUTANEOUS at 09:00

## 2018-07-23 RX ADMIN — MEMANTINE HYDROCHLORIDE 10 MILLIGRAM(S): 10 TABLET ORAL at 17:14

## 2018-07-23 RX ADMIN — CEFEPIME 1000 MILLIGRAM(S): 1 INJECTION, POWDER, FOR SOLUTION INTRAMUSCULAR; INTRAVENOUS at 17:14

## 2018-07-23 RX ADMIN — Medication 100 MILLIEQUIVALENT(S): at 04:39

## 2018-07-23 RX ADMIN — Medication 100 MILLIGRAM(S): at 21:30

## 2018-07-23 RX ADMIN — SODIUM CHLORIDE 150 MILLILITER(S): 9 INJECTION INTRAMUSCULAR; INTRAVENOUS; SUBCUTANEOUS at 01:23

## 2018-07-23 RX ADMIN — Medication 81 MILLIGRAM(S): at 12:25

## 2018-07-23 RX ADMIN — ENOXAPARIN SODIUM 60 MILLIGRAM(S): 100 INJECTION SUBCUTANEOUS at 06:14

## 2018-07-23 RX ADMIN — ALBUTEROL 2.5 MILLIGRAM(S): 90 AEROSOL, METERED ORAL at 20:29

## 2018-07-23 RX ADMIN — SERTRALINE 100 MILLIGRAM(S): 25 TABLET, FILM COATED ORAL at 12:25

## 2018-07-23 NOTE — ED ADULT NURSE REASSESSMENT NOTE - COMFORT CARE
plan of care explained/side rails up/wait time explained diaper changed/plan of care explained/side rails up/wait time explained

## 2018-07-23 NOTE — CONSULT NOTE ADULT - ASSESSMENT
89 y/o F PMHx significant for dementia, TIA, HTN, diabetes mellitus type 2, hyperlipidemia, recent hospitalization at [facility] for dizziness, admitted from snf on 7/22 for evaluation of increased shortness of breath, cough and confusion; upon admission the patient was found to be in rapid afib. History per medical record and family at bedside as patient is poor historian.  1. Patient admitted with pneumonia which will consider as health care associated pneumonia given recent hospitalization and transfer from snf  - patient at risk for gram negative rods and other resistant bacteria   - oxygen and nebs as needed   - serial cbc and monitor temperature   - reviewed prior medical records to evaluate for resistant or atypical pathogens   - will optimize antibiotics to cefepime which patient should tolerate given allergy to penicillin was from many years ago and most old allergies are due to cogeners in old formulations  - will add doxycycline to cover atypical and resistant bacteria  2. other issues; dementia, TIA, HTN, diabetes mellitus type 2, hyperlipidemia  - per medicine 89 y/o F PMHx significant for dementia, TIA, HTN, diabetes mellitus type 2, hyperlipidemia, recent hospitalization at [facility] for dizziness, admitted from snf on 7/22 for evaluation of increased shortness of breath, cough and confusion; upon admission the patient was found to be in rapid afib. History per medical record and family at bedside as patient is poor historian.  1. Patient admitted with pneumonia which will consider as health care associated pneumonia given recent hospitalization and transfer from snf; also noted with leukocytosis most likely reactive to pneumonia  - patient at risk for gram negative rods and other resistant bacteria   - oxygen and nebs as needed   - serial cbc and monitor temperature   - reviewed prior medical records to evaluate for resistant or atypical pathogens   - will optimize antibiotics to cefepime which patient should tolerate given allergy to penicillin was from many years ago and most old allergies are due to cogeners in old formulations  - will add doxycycline to cover atypical and resistant bacteria  2. other issues; dementia, TIA, HTN, diabetes mellitus type 2, hyperlipidemia  - per medicine

## 2018-07-23 NOTE — ED ADULT NURSE REASSESSMENT NOTE - NS ED NURSE REASSESS COMMENT FT1
diaper changed at this time
Patient heart rate noted to be irregular and fast in 140s, dr Valera notified. EKG done, orders received. will continue to monitor.
Assumed care of patient from LUPIS Tai. Patient resting comfortably in bed, Safety and comfort maintained. Will continue to monitor,

## 2018-07-23 NOTE — CONSULT NOTE ADULT - ASSESSMENT
SOB- likely secondary to pneumonia.  Fever and leucocytosis.  Management pre primary team.   she does not appear volume overloaded grossly.    Ejection systolic murmur- check 2 D echo to asses for aortic stenosis.    hypotension- resolved with IVF.   Likely from sepsis.    Other medical issues- Management per primary team.   Thank you for allowing me to participate in the care of this patient. Please feel free to contact me with any questions.

## 2018-07-23 NOTE — PROGRESS NOTE ADULT - SUBJECTIVE AND OBJECTIVE BOX
Patient is a 89y old  Female who presents with a chief complaint of Shortness of Breath       SUBJECTIVE:   HPI:  89 y/o F PMHx significant for dementia, TIA, HTN, diabetes mellitus type 2, hyperlipidemia, who was BIBA from [facility] for further evaluation of increased confusion which began last night (7/21) associated with cough, and low grade fever today (7/22). At [facility] the patient was found to be increasingly dyspneic and hypoxic (per EMS SpO2 90% PTA). Labs => WBC 17.17, Hgb/Hct 10.9/32.7, K 3.3, BUN/Cr 44/1.47. CT Head => moderate anterior periventricular white matter ischemia. CXR revealed bilateral lower lobe infiltrates. In the ED the patient was found to be in AFib w/ RVR.      7/23: pt now in NSR, appears dyspneic able to follow commands and answer questions but hypoxic to 88 % on RA. diffuse audible wheezing noted in B/L lung fieldsPT received 150ml NS since admission for sepsis protocol        REVIEW OF SYSTEMS:    CONSTITUTIONAL: No weakness, fevers or chills  EYES/ENT: No visual changes;  No vertigo or throat pain   NECK: No pain or stiffness  RESPIRATORY: No cough, wheezing, hemoptysis; No shortness of breath  CARDIOVASCULAR: No chest pain or palpitations  GASTROINTESTINAL: No abdominal or epigastric pain. No nausea, vomiting, or hematemesis; No diarrhea or constipation. No melena or hematochezia.  GENITOURINARY: No dysuria, frequency or hematuria  NEUROLOGICAL: No numbness or weakness  SKIN: No itching, burning, rashes, or lesions   All other review of systems is negative unless indicated above        ICU Vital Signs Last 24 Hrs  T(C): 36.8 (23 Jul 2018 08:00), Max: 39.2 (23 Jul 2018 00:53)  T(F): 98.2 (23 Jul 2018 08:00), Max: 102.6 (23 Jul 2018 00:53)  HR: 72 (23 Jul 2018 13:00) (61 - 143)  BP: 122/42 (23 Jul 2018 13:00) (97/44 - 128/46)  BP(mean): 62 (23 Jul 2018 13:00) (57 - 91)  ABP: --  ABP(mean): --  RR: 25 (23 Jul 2018 13:00) (19 - 25)  SpO2: 95% (23 Jul 2018 13:00) (93% - 98%)              PHYSICAL EXAM:    Constitutional: NAD, awake and alert, well-developed  HEENT: PERR, EOMI, Normal Hearing, MMM  Neck: Soft and supple, No LAD, No JVD  Respiratory: diffuse wheezing and rales   Cardiovascular: S1 and S2, regular rate and rhythm, no Murmurs, gallops or rubs  Gastrointestinal: Bowel Sounds present, soft, nontender, nondistended, no guarding, no rebound  Extremities: No peripheral edema  Vascular: 2+ peripheral pulses  Neurological: A/O x 3, no focal deficits  Musculoskeletal: 5/5 strength b/l upper and lower extremities  Skin: No rashes    MEDICATIONS:  MEDICATIONS  (STANDING):  ALBUTerol    0.083% 2.5 milliGRAM(s) Nebulizer every 6 hours  aspirin enteric coated 81 milliGRAM(s) Oral daily  atorvastatin 10 milliGRAM(s) Oral at bedtime  cefepime  Injectable. 1000 milliGRAM(s) IV Push every 12 hours  cholecalciferol 1000 Unit(s) Oral daily  dextrose 5%. 1000 milliLiter(s) (50 mL/Hr) IV Continuous <Continuous>  dextrose 50% Injectable 12.5 Gram(s) IV Push once  dextrose 50% Injectable 25 Gram(s) IV Push once  dextrose 50% Injectable 25 Gram(s) IV Push once  diltiazem    milliGRAM(s) Oral daily  docusate sodium 100 milliGRAM(s) Oral three times a day  donepezil 10 milliGRAM(s) Oral at bedtime  doxycycline hyclate Capsule 100 milliGRAM(s) Oral every 12 hours  enoxaparin Injectable 60 milliGRAM(s) SubCutaneous every 24 hours  furosemide   Injectable 40 milliGRAM(s) IV Push once  insulin lispro (HumaLOG) corrective regimen sliding scale   SubCutaneous three times a day before meals  latanoprost 0.005% Ophthalmic Solution 1 Drop(s) Both EYES at bedtime  levothyroxine 25 MICROGram(s) Oral daily  memantine 10 milliGRAM(s) Oral two times a day  risperiDONE   Tablet 0.25 milliGRAM(s) Oral daily  risperiDONE   Tablet 0.5 milliGRAM(s) Oral at bedtime  sertraline 100 milliGRAM(s) Oral daily  sodium chloride 0.9%. 1000 milliLiter(s) (150 mL/Hr) IV Continuous <Continuous>  warfarin 3 milliGRAM(s) Oral once      LABS: All Labs Reviewed:                        9.2    11.69 )-----------( 176      ( 23 Jul 2018 06:12 )             27.6     07-23    137  |  109<H>  |  32<H>  ----------------------------<  77  3.6   |  19<L>  |  0.98    Ca    7.5<L>      23 Jul 2018 06:12  Mg     2.2     07-23    TPro  6.5  /  Alb  2.5<L>  /  TBili  1.1  /  DBili  x   /  AST  79<H>  /  ALT  68  /  AlkPhos  107  07-23    PT/INR - ( 23 Jul 2018 09:38 )   PT: 17.6 sec;   INR: 1.61 ratio         PTT - ( 22 Jul 2018 15:12 )  PTT:26.4 sec  CARDIAC MARKERS ( 23 Jul 2018 06:12 )  0.048 ng/mL / x     / x     / x     / x      CARDIAC MARKERS ( 23 Jul 2018 01:57 )  0.046 ng/mL / x     / x     / x     / x              Blood Culture:     RADIOLOGY/EKG:    < from: Xray Chest 1 View AP/PA. (07.22.18 @ 15:58) >  IMPRESSION: Suspicion for bilateral lower lobe pneumonia    < end of copied text >

## 2018-07-23 NOTE — CONSULT NOTE ADULT - SUBJECTIVE AND OBJECTIVE BOX
Patient is a 89y old  Female who presents with a chief complaint of Shortness of Breath.    HPI:  89 y/o F PMHx significant for dementia, TIA, HTN, diabetes mellitus type 2, hyperlipidemia, who was BIBA from [facility] for further evaluation of increased confusion which began last night () associated with cough, and low grade fever today (). At [facility] the patient was found to be increasingly dyspneic and hypoxic (per EMS SpO2 90% PTA). Labs => WBC 17.17, Hgb/Hct 10.9/32.7, K 3.3, BUN/Cr 44/1.47. CT Head => moderate anterior periventricular white matter ischemia. CXR revealed bilateral lower lobe infiltrates. In the ED the patient was found to be in AFib w/ RVR.     Daughter at bedside now and stated that her mother had been having low grade fever and cough for few days.  She received IVF at the time of admission.    Pt states that she is feeling much better this am.  still cough.      PAST MEDICAL & SURGICAL HISTORY:  Dyslipidemia  Glaucoma  Hypertension  TIA (transient ischemic attack)  Dementia  Skin cancer: recent Sx Lt shoulder  MI (myocardial infarction)  Diabetes: Type 2  S/P cataract surgery: both eyes, multiple eye Sx in rte.  H/O heart surgery: remove benign tumor      MEDICATIONS  (STANDING):  aspirin enteric coated 81 milliGRAM(s) Oral daily  atorvastatin 10 milliGRAM(s) Oral at bedtime  aztreonam  IVPB      aztreonam  IVPB 1000 milliGRAM(s) IV Intermittent every 8 hours  cholecalciferol 1000 Unit(s) Oral daily  dextrose 5%. 1000 milliLiter(s) (50 mL/Hr) IV Continuous <Continuous>  dextrose 50% Injectable 12.5 Gram(s) IV Push once  dextrose 50% Injectable 25 Gram(s) IV Push once  dextrose 50% Injectable 25 Gram(s) IV Push once  diltiazem    milliGRAM(s) Oral daily  docusate sodium 100 milliGRAM(s) Oral three times a day  donepezil 10 milliGRAM(s) Oral at bedtime  enoxaparin Injectable 60 milliGRAM(s) SubCutaneous every 24 hours  insulin lispro (HumaLOG) corrective regimen sliding scale   SubCutaneous three times a day before meals  latanoprost 0.005% Ophthalmic Solution 1 Drop(s) Both EYES at bedtime  levoFLOXacin IVPB      levothyroxine 25 MICROGram(s) Oral daily  memantine 10 milliGRAM(s) Oral two times a day  risperiDONE   Tablet 0.25 milliGRAM(s) Oral daily  risperiDONE   Tablet 0.5 milliGRAM(s) Oral at bedtime  sertraline 100 milliGRAM(s) Oral daily  sodium chloride 0.9%. 1000 milliLiter(s) (150 mL/Hr) IV Continuous <Continuous>    MEDICATIONS  (PRN):  acetaminophen   Tablet 650 milliGRAM(s) Oral every 6 hours PRN For Temp greater than 38.5 C (101.3 F)  dextrose 40% Gel 15 Gram(s) Oral once PRN Blood Glucose LESS THAN 70 milliGRAM(s)/deciliter  glucagon  Injectable 1 milliGRAM(s) IntraMuscular once PRN Glucose LESS THAN 70 milligrams/deciliter  guaiFENesin   Syrup  (Sugar-Free) 200 milliGRAM(s) Oral every 6 hours PRN Cough  ondansetron Injectable 4 milliGRAM(s) IV Push every 6 hours PRN Nausea  senna 2 Tablet(s) Oral at bedtime PRN Constipation      FAMILY HISTORY:  Family history of CKD (chronic kidney disease) (Father)  Family history of CHF (congestive heart failure) (Mother)      SOCIAL HISTORY:  no smoking in recent past    REVIEW OF SYSTEMS:  CONSTITUTIONAL:    No fatigue, malaise, lethargy.  c/o fever or chills.  HEENT:  Eyes:  No visual changes.     ENT:  No epistaxis.  No sinus pain.    RESPIRATORY:  c/o cough.  No wheeze.  No hemoptysis.  No shortness of breath.  CARDIOVASCULAR:  No chest pains.  No palpitations. No shortness of breath, No orthopnea or PND.  GASTROINTESTINAL:  No abdominal pain.  No nausea or vomiting.    GENITOURINARY:    No hematuria.    MUSCULOSKELETAL:  No musculoskeletal pain.  No joint swelling.  No arthritis.  NEUROLOGICAL:  No tingling or numbness or weakness.  PSYCHIATRIC:  No confusion  SKIN:  No rashes.    ENDOCRINE:  No unexplained weight loss.  No polydipsia.   HEMATOLOGIC:  No anemia.  No prolonged or excessive bleeding.   ALLERGIC AND IMMUNOLOGIC:  No pruritus.          Vital Signs Last 24 Hrs  T(C): 38.3 (2018 03:30), Max: 39.2 (2018 00:53)  T(F): 100.9 (2018 03:30), Max: 102.6 (2018 00:53)  HR: 63 (2018 07:00) (61 - 143)  BP: 107/39 (2018 07:00) (100/46 - 129/73)  BP(mean): 57 (2018 07:00) (57 - 61)  RR: 24 (:) (20 - 25)  SpO2: 95% (:) (93% - 97%)    PHYSICAL EXAM-    Constitutional: The patient appears to be normal, well developed, well nourished and alert and oriented to time, place and person. The patient does not appear acutely ill.     Head: Head is normocephalic and atraumatic.      Neck: No jugular venous distention. No audible carotid bruits. There are strong carotid pulses bilaterally. No JVD.     Cardiovascular: Regular rate and rhythm without S3, S4. ESM 2/6     Respiratory: coarse crackles b/l basal    Abdomen: Soft, nontender, nondistended with positive bowel sounds.      Extremity: No tenderness. No  pitting edema     Neurologic: The patient is alert and oriented.      Skin: No rash, no obvious lesions noted.      Psychiatric: The patient appears to be emotionally stable.      INTERPRETATION OF TELEMETRY: sinus rythm    ECG: Sinus rythm , normal axis, no ST T changes.     I&O's Detail    2018 07:01  -  2018 07:00  --------------------------------------------------------  IN:    IV PiggyBack: 200 mL    Oral Fluid: 120 mL    Sodium Chloride 0.9% IV Bolus: 2000 mL    sodium chloride 0.9%.: 525 mL  Total IN: 2845 mL    OUT:  Total OUT: 0 mL    Total NET: 2845 mL          LABS:                        9.2    11.69 )-----------( 176      ( 2018 06:12 )             27.6         138  |  105  |  44<H>  ----------------------------<  71  3.3<L>   |  21<L>  |  1.47<H>    Ca    8.4<L>      2018 15:12  Mg     2.2         TPro  8.3  /  Alb  3.3  /  TBili  1.3<H>  /  DBili  x   /  AST  72<H>  /  ALT  69  /  AlkPhos  108      CARDIAC MARKERS ( 2018 01:57 )  0.046 ng/mL / x     / x     / x     / x          PT/INR - ( 2018 15:12 )   PT: 15.0 sec;   INR: 1.38 ratio         PTT - ( 2018 15:12 )  PTT:26.4 sec  Urinalysis Basic - ( 2018 15:12 )    Color: Yellow / Appearance: Clear / S.020 / pH: x  Gluc: x / Ketone: Negative  / Bili: Small / Urobili: 1 mg/dL   Blood: x / Protein: 30 mg/dL / Nitrite: Negative   Leuk Esterase: Small / RBC: 3-5 /HPF / WBC 6-10   Sq Epi: x / Non Sq Epi: Many / Bacteria: Moderate      I&O's Summary    2018 07:01  -  2018 07:00  --------------------------------------------------------  IN: 2845 mL / OUT: 0 mL / NET: 2845 mL      BNP  RADIOLOGY & ADDITIONAL STUDIES:  < from: Xray Chest 1 View AP/PA. (18 @ 15:58) >  EXAM:  XR CHEST AP OR PA 1V                            PROCEDURE DATE:  2018          INTERPRETATION:  Clinical information: Fever and cough    Portable AP chest radiograph from 1536 hours:    COMPARISON:  May 24, 2016    FINDINGS:  Sternotomy wires are identified. Unremarkable cardiac   mediastinal contours. Patchy airspace opacities at both lung bases. No   pneumothorax or pleural effusion.    IMPRESSION: Suspicion for bilateral lower lobe pneumonia                MARA BOWDEN   This document has been electronically signed. 2018  4:43PM        < end of copied text >
Patient is a 89y old  Female who presents with a chief complaint of Shortness of Breath (2018 23:40)    HPI:  89 y/o F PMHx significant for dementia, TIA, HTN, diabetes mellitus type 2, hyperlipidemia, recent hospitalization at [facility] for dizziness, admitted from [facility] on  for evaluation of increased shortness of breath, cough and confusion; upon admission the patient was found to be in rapid afib. History per medical record and family at bedside as patient is poor historian.            PMH: as above  PSH: as above  Meds: per reconciliation sheet, noted below  MEDICATIONS  (STANDING):  aspirin enteric coated 81 milliGRAM(s) Oral daily  atorvastatin 10 milliGRAM(s) Oral at bedtime  cefepime  Injectable. 1000 milliGRAM(s) IV Push every 12 hours  cholecalciferol 1000 Unit(s) Oral daily  dextrose 5%. 1000 milliLiter(s) (50 mL/Hr) IV Continuous <Continuous>  dextrose 50% Injectable 12.5 Gram(s) IV Push once  dextrose 50% Injectable 25 Gram(s) IV Push once  dextrose 50% Injectable 25 Gram(s) IV Push once  diltiazem    milliGRAM(s) Oral daily  docusate sodium 100 milliGRAM(s) Oral three times a day  donepezil 10 milliGRAM(s) Oral at bedtime  doxycycline hyclate Capsule 100 milliGRAM(s) Oral every 12 hours  enoxaparin Injectable 60 milliGRAM(s) SubCutaneous every 24 hours  insulin lispro (HumaLOG) corrective regimen sliding scale   SubCutaneous three times a day before meals  latanoprost 0.005% Ophthalmic Solution 1 Drop(s) Both EYES at bedtime  levothyroxine 25 MICROGram(s) Oral daily  memantine 10 milliGRAM(s) Oral two times a day  risperiDONE   Tablet 0.25 milliGRAM(s) Oral daily  risperiDONE   Tablet 0.5 milliGRAM(s) Oral at bedtime  sertraline 100 milliGRAM(s) Oral daily  sodium chloride 0.9%. 1000 milliLiter(s) (150 mL/Hr) IV Continuous <Continuous>  warfarin 5 milliGRAM(s) Oral daily    MEDICATIONS  (PRN):  acetaminophen   Tablet 650 milliGRAM(s) Oral every 6 hours PRN For Temp greater than 38.5 C (101.3 F)  dextrose 40% Gel 15 Gram(s) Oral once PRN Blood Glucose LESS THAN 70 milliGRAM(s)/deciliter  glucagon  Injectable 1 milliGRAM(s) IntraMuscular once PRN Glucose LESS THAN 70 milligrams/deciliter  guaiFENesin   Syrup  (Sugar-Free) 200 milliGRAM(s) Oral every 6 hours PRN Cough  ondansetron Injectable 4 milliGRAM(s) IV Push every 6 hours PRN Nausea  senna 2 Tablet(s) Oral at bedtime PRN Constipation    Allergies    penicillins (Unknown)  sulfa drugs (Unknown)    Intolerances      Social: no smoking, no alcohol, no illegal drugs; no recent travel, no exposure to TB  FAMILY HISTORY:  Family history of CKD (chronic kidney disease) (Father)  Family history of CHF (congestive heart failure) (Mother)     no history of premature cardiovascular disease in first degree relatives  ROS: unable to obtain secondary to patient medical condition     Vital Signs Last 24 Hrs  T(C): 36.8 (2018 08:00), Max: 39.2 (2018 00:53)  T(F): 98.2 (2018 08:00), Max: 102.6 (2018 00:53)  HR: 70 (2018 12:00) (61 - 143)  BP: 128/46 (2018 12:00) (97/44 - 129/73)  BP(mean): 65 (2018 12:00) (57 - 91)  RR: 25 (2018 12:00) (19 - 25)  SpO2: 98% (2018 12:00) (93% - 98%)  Daily Height in cm: 160.02 (2018 14:48)    Daily Weight in k.3 (2018 03:30)    PE:    Constitutional: frail looking  HEENT: NC/AT, EOMI, PERRLA, conjunctivae clear; ears and nose atraumatic; pharynx clear  Neck: supple; thyroid not palpable  Back: no tenderness  Respiratory: respiratory effort normal; bilateral rhonchi  Cardiovascular: S1S2 regular, no murmurs  Abdomen: soft, not tender, not distended, positive BS; no liver or spleen organomegaly  Genitourinary: no suprapubic tenderness  Musculoskeletal: no muscle tenderness, no joint swelling or tenderness  Neurological/ Psychiatric: confused judgement and insight normal;  moving all extremities  Skin: no rashes; no palpable lesions    Labs: all available labs reviewed                        9.2    11.69 )-----------( 176      ( 2018 06:12 )             27.6         137  |  109<H>  |  32<H>  ----------------------------<  77  3.6   |  19<L>  |  0.98    Ca    7.5<L>      2018 06:12  Mg     2.2         TPro  6.5  /  Alb  2.5<L>  /  TBili  1.1  /  DBili  x   /  AST  79<H>  /  ALT  68  /  AlkPhos  107       LIVER FUNCTIONS - ( 2018 06:12 )  Alb: 2.5 g/dL / Pro: 6.5 gm/dL / ALK PHOS: 107 U/L / ALT: 68 U/L / AST: 79 U/L / GGT: x           Urinalysis Basic - ( 2018 15:12 )    Color: Yellow / Appearance: Clear / S.020 / pH: x  Gluc: x / Ketone: Negative  / Bili: Small / Urobili: 1 mg/dL   Blood: x / Protein: 30 mg/dL / Nitrite: Negative   Leuk Esterase: Small / RBC: 3-5 /HPF / WBC 6-10   Sq Epi: x / Non Sq Epi: Many / Bacteria: Moderate      < from: Xray Chest 1 View AP/PA. (18 @ 15:58) >    EXAM:  XR CHEST AP OR PA 1V                            PROCEDURE DATE:  2018          INTERPRETATION:  Clinical information: Fever and cough    Portable AP chest radiograph from 1536 hours:    COMPARISON:  May 24, 2016    FINDINGS:  Sternotomy wires are identified. Unremarkable cardiac   mediastinal contours. Patchy airspace opacities at both lung bases. No   pneumothorax or pleural effusion.    IMPRESSION: Suspicion for bilateral lower lobe pneumonia      < end of copied text >              Radiology: all available radiological tests reviewed    Advanced directives addressed: full resuscitation

## 2018-07-24 LAB
ANION GAP SERPL CALC-SCNC: 11 MMOL/L — SIGNIFICANT CHANGE UP (ref 5–17)
BUN SERPL-MCNC: 26 MG/DL — HIGH (ref 7–23)
C DIFF BY PCR RESULT: SIGNIFICANT CHANGE UP
C DIFF TOX GENS STL QL NAA+PROBE: SIGNIFICANT CHANGE UP
CALCIUM SERPL-MCNC: 8.5 MG/DL — SIGNIFICANT CHANGE UP (ref 8.5–10.1)
CHLORIDE SERPL-SCNC: 108 MMOL/L — SIGNIFICANT CHANGE UP (ref 96–108)
CO2 SERPL-SCNC: 21 MMOL/L — LOW (ref 22–31)
CREAT SERPL-MCNC: 0.92 MG/DL — SIGNIFICANT CHANGE UP (ref 0.5–1.3)
FERRITIN SERPL-MCNC: 1160 NG/ML — HIGH (ref 15–150)
FOLATE SERPL-MCNC: 7.1 NG/ML — SIGNIFICANT CHANGE UP
GLUCOSE BLDC GLUCOMTR-MCNC: 108 MG/DL — HIGH (ref 70–99)
GLUCOSE BLDC GLUCOMTR-MCNC: 119 MG/DL — HIGH (ref 70–99)
GLUCOSE BLDC GLUCOMTR-MCNC: 130 MG/DL — HIGH (ref 70–99)
GLUCOSE SERPL-MCNC: 89 MG/DL — SIGNIFICANT CHANGE UP (ref 70–99)
HAPTOGLOB SERPL-MCNC: 346 MG/DL — HIGH (ref 34–200)
HCT VFR BLD CALC: 29.2 % — LOW (ref 34.5–45)
HGB BLD-MCNC: 9.7 G/DL — LOW (ref 11.5–15.5)
INR BLD: 1.46 RATIO — HIGH (ref 0.88–1.16)
IRON SATN MFR SERPL: 13 % — LOW (ref 14–50)
IRON SATN MFR SERPL: 25 UG/DL — LOW (ref 30–160)
M PNEUMO IGM SER-ACNC: 168 UNITS/ML — SIGNIFICANT CHANGE UP
MCHC RBC-ENTMCNC: 32.7 PG — SIGNIFICANT CHANGE UP (ref 27–34)
MCHC RBC-ENTMCNC: 33.2 GM/DL — SIGNIFICANT CHANGE UP (ref 32–36)
MCV RBC AUTO: 98.3 FL — SIGNIFICANT CHANGE UP (ref 80–100)
MYCOPLASMA AG SPEC QL: NEGATIVE — SIGNIFICANT CHANGE UP
NRBC # BLD: 0 /100 WBCS — SIGNIFICANT CHANGE UP (ref 0–0)
PLATELET # BLD AUTO: 184 K/UL — SIGNIFICANT CHANGE UP (ref 150–400)
POTASSIUM SERPL-MCNC: 3.2 MMOL/L — LOW (ref 3.5–5.3)
POTASSIUM SERPL-MCNC: 4.1 MMOL/L — SIGNIFICANT CHANGE UP (ref 3.5–5.3)
POTASSIUM SERPL-SCNC: 3.2 MMOL/L — LOW (ref 3.5–5.3)
POTASSIUM SERPL-SCNC: 4.1 MMOL/L — SIGNIFICANT CHANGE UP (ref 3.5–5.3)
PROTHROM AB SERPL-ACNC: 15.9 SEC — HIGH (ref 9.8–12.7)
RBC # BLD: 2.97 M/UL — LOW (ref 3.8–5.2)
RBC # BLD: 2.97 M/UL — LOW (ref 3.8–5.2)
RBC # FLD: 14.2 % — SIGNIFICANT CHANGE UP (ref 10.3–14.5)
RETICS #: 38.9 K/UL — SIGNIFICANT CHANGE UP (ref 25–125)
RETICS/RBC NFR: 1.3 % — SIGNIFICANT CHANGE UP (ref 0.5–2.5)
SODIUM SERPL-SCNC: 140 MMOL/L — SIGNIFICANT CHANGE UP (ref 135–145)
TIBC SERPL-MCNC: 193 UG/DL — LOW (ref 220–430)
TROPONIN I SERPL-MCNC: 0.04 NG/ML — SIGNIFICANT CHANGE UP (ref 0.01–0.04)
UIBC SERPL-MCNC: 168 UG/DL — SIGNIFICANT CHANGE UP (ref 110–370)
VIT B12 SERPL-MCNC: 1067 PG/ML — SIGNIFICANT CHANGE UP (ref 232–1245)
WBC # BLD: 10.51 K/UL — HIGH (ref 3.8–10.5)
WBC # FLD AUTO: 10.51 K/UL — HIGH (ref 3.8–10.5)

## 2018-07-24 RX ORDER — METRONIDAZOLE 500 MG
500 TABLET ORAL ONCE
Qty: 0 | Refills: 0 | Status: COMPLETED | OUTPATIENT
Start: 2018-07-24 | End: 2018-07-24

## 2018-07-24 RX ORDER — FUROSEMIDE 40 MG
40 TABLET ORAL DAILY
Qty: 0 | Refills: 0 | Status: DISCONTINUED | OUTPATIENT
Start: 2018-07-24 | End: 2018-07-26

## 2018-07-24 RX ORDER — METRONIDAZOLE 500 MG
TABLET ORAL
Qty: 0 | Refills: 0 | Status: DISCONTINUED | OUTPATIENT
Start: 2018-07-24 | End: 2018-07-25

## 2018-07-24 RX ORDER — METRONIDAZOLE 500 MG
500 TABLET ORAL EVERY 8 HOURS
Qty: 0 | Refills: 0 | Status: DISCONTINUED | OUTPATIENT
Start: 2018-07-24 | End: 2018-07-25

## 2018-07-24 RX ORDER — WARFARIN SODIUM 2.5 MG/1
5 TABLET ORAL DAILY
Qty: 0 | Refills: 0 | Status: COMPLETED | OUTPATIENT
Start: 2018-07-24 | End: 2018-07-26

## 2018-07-24 RX ORDER — POTASSIUM CHLORIDE 20 MEQ
40 PACKET (EA) ORAL EVERY 4 HOURS
Qty: 0 | Refills: 0 | Status: COMPLETED | OUTPATIENT
Start: 2018-07-24 | End: 2018-07-24

## 2018-07-24 RX ADMIN — Medication 100 MILLIGRAM(S): at 10:19

## 2018-07-24 RX ADMIN — ATORVASTATIN CALCIUM 10 MILLIGRAM(S): 80 TABLET, FILM COATED ORAL at 21:20

## 2018-07-24 RX ADMIN — Medication 40 MILLIEQUIVALENT(S): at 09:48

## 2018-07-24 RX ADMIN — Medication 100 MILLIGRAM(S): at 17:15

## 2018-07-24 RX ADMIN — Medication 200 MILLIGRAM(S): at 11:29

## 2018-07-24 RX ADMIN — Medication 25 MICROGRAM(S): at 05:51

## 2018-07-24 RX ADMIN — Medication 100 MILLIGRAM(S): at 05:51

## 2018-07-24 RX ADMIN — Medication 100 MILLIGRAM(S): at 05:52

## 2018-07-24 RX ADMIN — MEMANTINE HYDROCHLORIDE 10 MILLIGRAM(S): 10 TABLET ORAL at 17:15

## 2018-07-24 RX ADMIN — DONEPEZIL HYDROCHLORIDE 10 MILLIGRAM(S): 10 TABLET, FILM COATED ORAL at 21:20

## 2018-07-24 RX ADMIN — RISPERIDONE 0.25 MILLIGRAM(S): 4 TABLET ORAL at 11:29

## 2018-07-24 RX ADMIN — ALBUTEROL 2.5 MILLIGRAM(S): 90 AEROSOL, METERED ORAL at 14:22

## 2018-07-24 RX ADMIN — Medication 40 MILLIEQUIVALENT(S): at 13:19

## 2018-07-24 RX ADMIN — WARFARIN SODIUM 5 MILLIGRAM(S): 2.5 TABLET ORAL at 21:20

## 2018-07-24 RX ADMIN — Medication 100 MILLIGRAM(S): at 13:18

## 2018-07-24 RX ADMIN — MEMANTINE HYDROCHLORIDE 10 MILLIGRAM(S): 10 TABLET ORAL at 05:51

## 2018-07-24 RX ADMIN — ALBUTEROL 2.5 MILLIGRAM(S): 90 AEROSOL, METERED ORAL at 20:23

## 2018-07-24 RX ADMIN — ALBUTEROL 2.5 MILLIGRAM(S): 90 AEROSOL, METERED ORAL at 01:36

## 2018-07-24 RX ADMIN — ALBUTEROL 2.5 MILLIGRAM(S): 90 AEROSOL, METERED ORAL at 08:09

## 2018-07-24 RX ADMIN — Medication 40 MILLIGRAM(S): at 10:19

## 2018-07-24 RX ADMIN — CEFEPIME 1000 MILLIGRAM(S): 1 INJECTION, POWDER, FOR SOLUTION INTRAMUSCULAR; INTRAVENOUS at 06:02

## 2018-07-24 RX ADMIN — ENOXAPARIN SODIUM 60 MILLIGRAM(S): 100 INJECTION SUBCUTANEOUS at 05:52

## 2018-07-24 RX ADMIN — Medication 200 MILLIGRAM(S): at 21:20

## 2018-07-24 RX ADMIN — Medication 120 MILLIGRAM(S): at 05:51

## 2018-07-24 RX ADMIN — CEFEPIME 1000 MILLIGRAM(S): 1 INJECTION, POWDER, FOR SOLUTION INTRAMUSCULAR; INTRAVENOUS at 17:15

## 2018-07-24 RX ADMIN — SERTRALINE 100 MILLIGRAM(S): 25 TABLET, FILM COATED ORAL at 11:29

## 2018-07-24 RX ADMIN — LATANOPROST 1 DROP(S): 0.05 SOLUTION/ DROPS OPHTHALMIC; TOPICAL at 21:20

## 2018-07-24 RX ADMIN — Medication 1000 UNIT(S): at 11:29

## 2018-07-24 RX ADMIN — Medication 81 MILLIGRAM(S): at 11:28

## 2018-07-24 RX ADMIN — RISPERIDONE 0.5 MILLIGRAM(S): 4 TABLET ORAL at 21:20

## 2018-07-24 NOTE — PHYSICAL THERAPY INITIAL EVALUATION ADULT - PLANNED THERAPY INTERVENTIONS, PT EVAL
ROM/oxygen/energy conservation, home safety & fall prevention/bed mobility training/gait training/transfer training/strengthening

## 2018-07-24 NOTE — PROGRESS NOTE ADULT - SUBJECTIVE AND OBJECTIVE BOX
Patient is a 89y old  Female who presents with a chief complaint of Shortness of Breath (22 Jul 2018 23:40)    Date of service: 07-24-18 @ 17:38  Patient noted with loose stools  Afebrile  Appears comfortable, denies abdominal pain          ROS: no fever or chills; denies dizziness, no HA, no SOB or cough, no abdominal pain, no  constipation; no dysuria, no urinary frequency, no legs pain, no rashes    MEDICATIONS  (STANDING):  ALBUTerol    0.083% 2.5 milliGRAM(s) Nebulizer every 6 hours  aspirin enteric coated 81 milliGRAM(s) Oral daily  atorvastatin 10 milliGRAM(s) Oral at bedtime  cefepime  Injectable. 1000 milliGRAM(s) IV Push every 12 hours  cholecalciferol 1000 Unit(s) Oral daily  dextrose 5%. 1000 milliLiter(s) (50 mL/Hr) IV Continuous <Continuous>  dextrose 50% Injectable 12.5 Gram(s) IV Push once  dextrose 50% Injectable 25 Gram(s) IV Push once  dextrose 50% Injectable 25 Gram(s) IV Push once  diltiazem    milliGRAM(s) Oral daily  docusate sodium 100 milliGRAM(s) Oral three times a day  donepezil 10 milliGRAM(s) Oral at bedtime  doxycycline hyclate Capsule 100 milliGRAM(s) Oral every 12 hours  enoxaparin Injectable 60 milliGRAM(s) SubCutaneous every 24 hours  furosemide   Injectable 40 milliGRAM(s) IV Push daily  insulin lispro (HumaLOG) corrective regimen sliding scale   SubCutaneous three times a day before meals  latanoprost 0.005% Ophthalmic Solution 1 Drop(s) Both EYES at bedtime  levothyroxine 25 MICROGram(s) Oral daily  memantine 10 milliGRAM(s) Oral two times a day  metroNIDAZOLE  IVPB      metroNIDAZOLE  IVPB 500 milliGRAM(s) IV Intermittent every 8 hours  risperiDONE   Tablet 0.25 milliGRAM(s) Oral daily  risperiDONE   Tablet 0.5 milliGRAM(s) Oral at bedtime  sertraline 100 milliGRAM(s) Oral daily  warfarin 5 milliGRAM(s) Oral daily    MEDICATIONS  (PRN):  acetaminophen   Tablet 650 milliGRAM(s) Oral every 6 hours PRN For Temp greater than 38.5 C (101.3 F)  dextrose 40% Gel 15 Gram(s) Oral once PRN Blood Glucose LESS THAN 70 milliGRAM(s)/deciliter  glucagon  Injectable 1 milliGRAM(s) IntraMuscular once PRN Glucose LESS THAN 70 milligrams/deciliter  guaiFENesin   Syrup  (Sugar-Free) 200 milliGRAM(s) Oral every 6 hours PRN Cough  ondansetron Injectable 4 milliGRAM(s) IV Push every 6 hours PRN Nausea  senna 2 Tablet(s) Oral at bedtime PRN Constipation      Vital Signs Last 24 Hrs  T(C): 36.9 (24 Jul 2018 16:00), Max: 37.9 (24 Jul 2018 09:00)  T(F): 98.5 (24 Jul 2018 16:00), Max: 100.3 (24 Jul 2018 09:00)  HR: 76 (24 Jul 2018 17:00) (60 - 132)  BP: 126/42 (24 Jul 2018 17:00) (89/30 - 156/124)  BP(mean): 59 (24 Jul 2018 17:00) (45 - 131)  RR: 16 (24 Jul 2018 17:00) (16 - 27)  SpO2: 96% (24 Jul 2018 17:00) (93% - 99%)    Physical Exam:    PE:    Constitutional: frail looking  HEENT: NC/AT, EOMI, PERRLA, conjunctivae clear; ears and nose atraumatic; pharynx clear  Neck: supple; thyroid not palpable  Back: no tenderness  Respiratory: respiratory effort normal; bilateral rhonchi  Cardiovascular: S1S2 regular, no murmurs  Abdomen: soft, not tender, not distended, positive BS; no liver or spleen organomegaly  Genitourinary: no suprapubic tenderness  Musculoskeletal: no muscle tenderness, no joint swelling or tenderness  Neurological/ Psychiatric: confused judgement and insight normal;  moving all extremities  Skin: no rashes; no palpable lesions    Labs: all available labs reviewed                       Labs:                        9.7    10.51 )-----------( 184      ( 24 Jul 2018 04:39 )             29.2     07-24    x   |  x   |  x   ----------------------------<  x   4.1   |  x   |  x     Ca    8.5      24 Jul 2018 04:39  Mg     2.2     07-23    TPro  6.5  /  Alb  2.5<L>  /  TBili  1.1  /  DBili  x   /  AST  79<H>  /  ALT  68  /  AlkPhos  107  07-23           Cultures:       Culture - Blood (collected 07-22-18 @ 20:23)  Source: .Blood aerobic bottle only  Preliminary Report (07-23-18 @ 21:02):    No growth to date.    Culture - Urine (collected 07-22-18 @ 15:12)  Source: .Urine Clean Catch (Midstream)  Final Report (07-23-18 @ 22:21):    Culture grew 3 or more types of organisms which indicate    collection contamination; consider recollection only if clinically    indicated.    Culture - Blood (collected 07-22-18 @ 15:12)  Source: .Blood Blood-Peripheral  Preliminary Report (07-23-18 @ 21:02):    No growth to date.            < from: Xray Chest 1 View AP/PA. (07.22.18 @ 15:58) >    EXAM:  XR CHEST AP OR PA 1V                            PROCEDURE DATE:  07/22/2018          INTERPRETATION:  Clinical information: Fever and cough    Portable AP chest radiograph from 1536 hours:    COMPARISON:  May 24, 2016    FINDINGS:  Sternotomy wires are identified. Unremarkable cardiac   mediastinal contours. Patchy airspace opacities at both lung bases. No   pneumothorax or pleural effusion.    IMPRESSION: Suspicion for bilateral lower lobe pneumonia      < end of copied text >              Radiology: all available radiological tests reviewed    Advanced directives addressed: full resuscitation

## 2018-07-24 NOTE — PHYSICAL THERAPY INITIAL EVALUATION ADULT - GENERAL OBSERVATIONS, REHAB EVAL
sitting up in bedside chair wearing nasal oxygen,HM,after diaper changed ,awake,alert,Ox2,able to tell me she lives at assisted living but unable to tell me which one

## 2018-07-24 NOTE — PHYSICAL THERAPY INITIAL EVALUATION ADULT - PERTINENT HX OF CURRENT PROBLEM, REHAB EVAL
AMS,increased confusion at nursing home since 7/21,dyspnea/wheezing AMS ,increased confusion at nursing home since 7/21,dyspnea/wheezing

## 2018-07-24 NOTE — PHYSICAL THERAPY INITIAL EVALUATION ADULT - PATIENT PROFILE REVIEW, REHAB EVAL
yes pt now on contact isolation to r/o C.Diff. ,had watery greenish malodorous diarrhea today per RN Kimberley/yes

## 2018-07-24 NOTE — PROGRESS NOTE ADULT - SUBJECTIVE AND OBJECTIVE BOX
Patient is a 89y old  Female who presents with a chief complaint of Shortness of Breath       SUBJECTIVE:   HPI:  89 y/o F PMHx significant for dementia, TIA, HTN, diabetes mellitus type 2, hyperlipidemia, who was BIBA from [facility] for further evaluation of increased confusion which began last night (7/21) associated with cough, and low grade fever today (7/22). At [facility] the patient was found to be increasingly dyspneic and hypoxic (per EMS SpO2 90% PTA). Labs => WBC 17.17, Hgb/Hct 10.9/32.7, K 3.3, BUN/Cr 44/1.47. CT Head => moderate anterior periventricular white matter ischemia. CXR revealed bilateral lower lobe infiltrates. In the ED the patient was found to be in AFib w/ RVR.      7/23: pt now in NSR, appears dyspneic able to follow commands and answer questions but hypoxic to 88 % on RA. diffuse audible wheezing noted in B/L lung fieldsPT received 150ml NS since admission for sepsis protocol  7/24: up and out of bed. having large quantity foul smelling stool suspicious for c diff        REVIEW OF SYSTEMS:    CONSTITUTIONAL: No weakness, fevers or chills  EYES/ENT: No visual changes;  No vertigo or throat pain   NECK: No pain or stiffness  RESPIRATORY: No cough, wheezing, hemoptysis; No shortness of breath  CARDIOVASCULAR: No chest pain or palpitations  GASTROINTESTINAL: No abdominal or epigastric pain. No nausea, vomiting, or hematemesis; No diarrhea or constipation. No melena or hematochezia.  GENITOURINARY: No dysuria, frequency or hematuria  NEUROLOGICAL: No numbness or weakness  SKIN: No itching, burning, rashes, or lesions   All other review of systems is negative unless indicated above        ICU Vital Signs Last 24 Hrs  T(C): 37.8 (24 Jul 2018 14:51), Max: 37.9 (24 Jul 2018 09:00)  T(F): 100 (24 Jul 2018 14:51), Max: 100.3 (24 Jul 2018 09:00)  HR: 79 (24 Jul 2018 12:00) (62 - 132)  BP: 115/50 (24 Jul 2018 12:00) (101/48 - 156/124)  BP(mean): 67 (24 Jul 2018 12:00) (58 - 131)  ABP: --  ABP(mean): --  RR: 23 (24 Jul 2018 12:00) (17 - 27)  SpO2: 95% (24 Jul 2018 12:00) (93% - 99%)              PHYSICAL EXAM:    Constitutional: NAD, awake and alert, well-developed  HEENT: PERR, EOMI, Normal Hearing, MMM  Neck: Soft and supple, No LAD, No JVD  Respiratory: diffuse wheezing and rales   Cardiovascular: S1 and S2, regular rate and rhythm, no Murmurs, gallops or rubs  Gastrointestinal: Bowel Sounds present, soft, nontender, nondistended, no guarding, no rebound  Extremities: No peripheral edema  Vascular: 2+ peripheral pulses  Neurological: A/O x 3, no focal deficits  Musculoskeletal: 5/5 strength b/l upper and lower extremities  Skin: No rashes    MEDICATIONS:  MEDICATIONS  (STANDING):  ALBUTerol    0.083% 2.5 milliGRAM(s) Nebulizer every 6 hours  aspirin enteric coated 81 milliGRAM(s) Oral daily  atorvastatin 10 milliGRAM(s) Oral at bedtime  cefepime  Injectable. 1000 milliGRAM(s) IV Push every 12 hours  cholecalciferol 1000 Unit(s) Oral daily  dextrose 5%. 1000 milliLiter(s) (50 mL/Hr) IV Continuous <Continuous>  dextrose 50% Injectable 12.5 Gram(s) IV Push once  dextrose 50% Injectable 25 Gram(s) IV Push once  dextrose 50% Injectable 25 Gram(s) IV Push once  diltiazem    milliGRAM(s) Oral daily  docusate sodium 100 milliGRAM(s) Oral three times a day  donepezil 10 milliGRAM(s) Oral at bedtime  doxycycline hyclate Capsule 100 milliGRAM(s) Oral every 12 hours  enoxaparin Injectable 60 milliGRAM(s) SubCutaneous every 24 hours  furosemide   Injectable 40 milliGRAM(s) IV Push once  insulin lispro (HumaLOG) corrective regimen sliding scale   SubCutaneous three times a day before meals  latanoprost 0.005% Ophthalmic Solution 1 Drop(s) Both EYES at bedtime  levothyroxine 25 MICROGram(s) Oral daily  memantine 10 milliGRAM(s) Oral two times a day  risperiDONE   Tablet 0.25 milliGRAM(s) Oral daily  risperiDONE   Tablet 0.5 milliGRAM(s) Oral at bedtime  sertraline 100 milliGRAM(s) Oral daily  sodium chloride 0.9%. 1000 milliLiter(s) (150 mL/Hr) IV Continuous <Continuous>  warfarin 3 milliGRAM(s) Oral once      LABS: All Labs Reviewed:                        9.2    11.69 )-----------( 176      ( 23 Jul 2018 06:12 )             27.6     07-23    137  |  109<H>  |  32<H>  ----------------------------<  77  3.6   |  19<L>  |  0.98    Ca    7.5<L>      23 Jul 2018 06:12  Mg     2.2     07-23    TPro  6.5  /  Alb  2.5<L>  /  TBili  1.1  /  DBili  x   /  AST  79<H>  /  ALT  68  /  AlkPhos  107  07-23    PT/INR - ( 23 Jul 2018 09:38 )   PT: 17.6 sec;   INR: 1.61 ratio         PTT - ( 22 Jul 2018 15:12 )  PTT:26.4 sec  CARDIAC MARKERS ( 23 Jul 2018 06:12 )  0.048 ng/mL / x     / x     / x     / x      CARDIAC MARKERS ( 23 Jul 2018 01:57 )  0.046 ng/mL / x     / x     / x     / x              Blood Culture:     RADIOLOGY/EKG:    < from: Xray Chest 1 View AP/PA. (07.22.18 @ 15:58) >  IMPRESSION: Suspicion for bilateral lower lobe pneumonia    < end of copied text >

## 2018-07-24 NOTE — PROGRESS NOTE ADULT - SUBJECTIVE AND OBJECTIVE BOX
Patient is a 89y old  Female who presents with a chief complaint of Shortness of Breath.    HPI:  89 y/o F PMHx significant for dementia, TIA, HTN, diabetes mellitus type 2, hyperlipidemia, who was BIBA from [facility] for further evaluation of increased confusion which began last night () associated with cough, and low grade fever today (). At [facility] the patient was found to be increasingly dyspneic and hypoxic (per EMS SpO2 90% PTA). Labs => WBC 17.17, Hgb/Hct 10.9/32.7, K 3.3, BUN/Cr 44/1.47. CT Head => moderate anterior periventricular white matter ischemia. CXR revealed bilateral lower lobe infiltrates. In the ED the patient was found to be in AFib w/ RVR.     Daughter at bedside now and stated that her mother had been having low grade fever and cough for few days.  She received IVF at the time of admission.    Pt states that she is feeling much better this am.  still cough.    18- pt seen and examined by me today. She denies any symptoms.       PAST MEDICAL & SURGICAL HISTORY:  Dyslipidemia  Glaucoma  Hypertension  TIA (transient ischemic attack)  Dementia  Skin cancer: recent Sx Lt shoulder  MI (myocardial infarction)  Diabetes: Type 2  S/P cataract surgery: both eyes, multiple eye Sx in rteye.  H/O heart surgery: remove benign tumor      MEDICATIONS  (STANDING):  aspirin enteric coated 81 milliGRAM(s) Oral daily  atorvastatin 10 milliGRAM(s) Oral at bedtime  aztreonam  IVPB      aztreonam  IVPB 1000 milliGRAM(s) IV Intermittent every 8 hours  cholecalciferol 1000 Unit(s) Oral daily  dextrose 5%. 1000 milliLiter(s) (50 mL/Hr) IV Continuous <Continuous>  dextrose 50% Injectable 12.5 Gram(s) IV Push once  dextrose 50% Injectable 25 Gram(s) IV Push once  dextrose 50% Injectable 25 Gram(s) IV Push once  diltiazem    milliGRAM(s) Oral daily  docusate sodium 100 milliGRAM(s) Oral three times a day  donepezil 10 milliGRAM(s) Oral at bedtime  enoxaparin Injectable 60 milliGRAM(s) SubCutaneous every 24 hours  insulin lispro (HumaLOG) corrective regimen sliding scale   SubCutaneous three times a day before meals  latanoprost 0.005% Ophthalmic Solution 1 Drop(s) Both EYES at bedtime  levoFLOXacin IVPB      levothyroxine 25 MICROGram(s) Oral daily  memantine 10 milliGRAM(s) Oral two times a day  risperiDONE   Tablet 0.25 milliGRAM(s) Oral daily  risperiDONE   Tablet 0.5 milliGRAM(s) Oral at bedtime  sertraline 100 milliGRAM(s) Oral daily  sodium chloride 0.9%. 1000 milliLiter(s) (150 mL/Hr) IV Continuous <Continuous>    MEDICATIONS  (PRN):  acetaminophen   Tablet 650 milliGRAM(s) Oral every 6 hours PRN For Temp greater than 38.5 C (101.3 F)  dextrose 40% Gel 15 Gram(s) Oral once PRN Blood Glucose LESS THAN 70 milliGRAM(s)/deciliter  glucagon  Injectable 1 milliGRAM(s) IntraMuscular once PRN Glucose LESS THAN 70 milligrams/deciliter  guaiFENesin   Syrup  (Sugar-Free) 200 milliGRAM(s) Oral every 6 hours PRN Cough  ondansetron Injectable 4 milliGRAM(s) IV Push every 6 hours PRN Nausea  senna 2 Tablet(s) Oral at bedtime PRN Constipation      FAMILY HISTORY:  Family history of CKD (chronic kidney disease) (Father)  Family history of CHF (congestive heart failure) (Mother)      SOCIAL HISTORY:  no smoking in recent past    REVIEW OF SYSTEMS:  CONSTITUTIONAL:    No fatigue, malaise, lethargy.  c/o fever or chills.  HEENT:  Eyes:  No visual changes.     ENT:  No epistaxis.  No sinus pain.    RESPIRATORY:  c/o cough.  No wheeze.  No hemoptysis.  No shortness of breath.  CARDIOVASCULAR:  No chest pains.  No palpitations. No shortness of breath, No orthopnea or PND.  GASTROINTESTINAL:  No abdominal pain.  No nausea or vomiting.    GENITOURINARY:    No hematuria.    MUSCULOSKELETAL:  No musculoskeletal pain.  No joint swelling.  No arthritis.  NEUROLOGICAL:  No tingling or numbness or weakness.  PSYCHIATRIC:  No confusion  SKIN:  No rashes.    ENDOCRINE:  No unexplained weight loss.  No polydipsia.   HEMATOLOGIC:  No anemia.  No prolonged or excessive bleeding.   ALLERGIC AND IMMUNOLOGIC:  No pruritus.          Vital Signs Last 24 Hrs  T(C): 38.3 (2018 03:30), Max: 39.2 (2018 00:53)  T(F): 100.9 (2018 03:30), Max: 102.6 (2018 00:53)  HR: 63 (2018 07:00) (61 - 143)  BP: 107/39 (2018 07:00) (100/46 - 129/73)  BP(mean): 57 (:) (57 - 61)  RR: 24 (:) (20 - 25)  SpO2: 95% (:) (93% - 97%)    PHYSICAL EXAM-    Constitutional: The patient appears to be normal, well developed, well nourished and alert and oriented to time, place and person. The patient does not appear acutely ill.     Head: Head is normocephalic and atraumatic.      Neck: No jugular venous distention. No audible carotid bruits. There are strong carotid pulses bilaterally. No JVD.     Cardiovascular: Regular rate and rhythm without S3, S4. ESM 2/6     Respiratory: coarse crackles b/l basal    Abdomen: Soft, nontender, nondistended with positive bowel sounds.      Extremity: No tenderness. No  pitting edema     Neurologic: The patient is alert and oriented.      Skin: No rash, no obvious lesions noted.      Psychiatric: The patient appears to be emotionally stable.      INTERPRETATION OF TELEMETRY: sinus rythm    ECG: Sinus rythm , normal axis, no ST T changes.     I&O's Detail    2018 07:01  -  2018 07:00  --------------------------------------------------------  IN:    IV PiggyBack: 200 mL    Oral Fluid: 120 mL    Sodium Chloride 0.9% IV Bolus: 2000 mL    sodium chloride 0.9%.: 525 mL  Total IN: 2845 mL    OUT:  Total OUT: 0 mL    Total NET: 2845 mL          LABS:                        9.2    11.69 )-----------( 176      ( 2018 06:12 )             27.6     07-    138  |  105  |  44<H>  ----------------------------<  71  3.3<L>   |  21<L>  |  1.47<H>    Ca    8.4<L>      2018 15:12  Mg     2.2         TPro  8.3  /  Alb  3.3  /  TBili  1.3<H>  /  DBili  x   /  AST  72<H>  /  ALT  69  /  AlkPhos  108  07-    CARDIAC MARKERS ( 2018 01:57 )  0.046 ng/mL / x     / x     / x     / x          PT/INR - ( 2018 15:12 )   PT: 15.0 sec;   INR: 1.38 ratio         PTT - ( 2018 15:12 )  PTT:26.4 sec  Urinalysis Basic - ( 2018 15:12 )    Color: Yellow / Appearance: Clear / S.020 / pH: x  Gluc: x / Ketone: Negative  / Bili: Small / Urobili: 1 mg/dL   Blood: x / Protein: 30 mg/dL / Nitrite: Negative   Leuk Esterase: Small / RBC: 3-5 /HPF / WBC 6-10   Sq Epi: x / Non Sq Epi: Many / Bacteria: Moderate      I&O's Summary    2018 07:01  -  2018 07:00  --------------------------------------------------------  IN: 2845 mL / OUT: 0 mL / NET: 2845 mL      BNP  RADIOLOGY & ADDITIONAL STUDIES:  < from: Xray Chest 1 View AP/PA. (18 @ 15:58) >  EXAM:  XR CHEST AP OR PA 1V                            PROCEDURE DATE:  2018          INTERPRETATION:  Clinical information: Fever and cough    Portable AP chest radiograph from 1536 hours:    COMPARISON:  May 24, 2016    FINDINGS:  Sternotomy wires are identified. Unremarkable cardiac   mediastinal contours. Patchy airspace opacities at both lung bases. No   pneumothorax or pleural effusion.    IMPRESSION: Suspicion for bilateral lower lobe pneumonia                MARA BOWDEN   This document has been electronically signed. 2018  4:43PM        < end of copied text >  < from: Transthoracic Echocardiogram (18 @ 14:45) >  EXAM:  2D ECHOCARDIOGRAM AD         PROCEDURE DATE:  2018        INTERPRETATION:  Transthoracic Echocardiography Report (TTE)     Demographics     Patient name      SAM HICKEY              Age           89 year(s)     Med Rec #         [id number]              Gender        Female     Account #         [id number]                Date of Birth 10/22/1927     Interpreting      Venugopal Palla, MD    Room Number   0035   Physician     Referring         Janell Beckford             Sonographer   Steve,   Physician         Arianne DIALLO                  [facility]     Date of study     2018 11:10 AM     Height            62.99 in               Weight        147.71 pounds    Type of Study:     TTE procedure: 2D echocardiogram AD     BP: 125/75 mmHg     Study Location: [facility] Quality: Technically Difficullt    Indications   1) R06.00 - Dyspnea    M-Mode Measurements (cm)     LVEDd: 4.65 cm            LVESd: 3.29 cm   IVSEd: 0.87 cm   LVPWd: 1 cm               AO Root Dimension: 3.5 cm                             ACS: 1 cm                             LA: 4.1 cm                             LVOT: 2.1 cm    Doppler Measurements:     AV Velocity:278 cm/s                 MV Peak E-Wave: 115 cm/s   AV Peak Gradient: 30.91 mmHg      MV Peak A-Wave: 65.2 cm/s   AV Mean Gradient: 16 mmHg            MV E/A Ratio: 1.76 %   AV Area (Continuity):1.76 cm^2       MV Peak Gradient: 5.29 mmHg   TR Velocity:327 cm/s   TR Gradient:42.7716 mmHg   Estimated RAP:10 mmHg   RVSP:47 mmHg     Findings     Mitral Valve   Normal appearing mitral valve structure and function.   Moderate (2+) mitral regurgitation is present.   Mild mitral annular calcification is present.     Aortic Valve   Significant fibrocalcific changes noted to the aortic valve leaflets with   restriction in leaflet excursion. Peak transaortic gradient is 30 mmHg;   this finding is consistent with mild aortic stenosis.     Tricuspid Valve   Normal appearing tricuspid valve structure and function.   Moderate (2+) tricuspid valve regurgitation is present.   Moderate pulmonary hypertension.     Pulmonic Valve   Normal appearing pulmonic valve structure and function.   Mild pulmonic valvular regurgitation (1+) is present.     Left Atrium   The left atrium is mildly dilated.     Left Ventricle   The left ventricle is normal in size, wall thickness, wall motion and   contractility.   Estimated left ventricular ejection fraction is 55-60 %.     Right Atrium   Normal appearing right atrium.     Right Ventricle   Normal appearing right ventricle structure and function.     Pericardial Effusion   No evidence of pericardial effusion.     Pleural Effusion   No evidence of pleural effusion.     Miscellaneous   All visualized extra cardiac structures appears to be normal.     Impression     Summary     Normal appearing mitral valve structure and function.   Moderate (2+) mitral regurgitation is present.   Mild mitral annular calcification is present.   Significant fibrocalcific changes noted to the aortic valve leaflets with   restriction in leaflet excursion. Peak transaortic gradient is 30 mmHg;   this finding is consistent with mild aortic stenosis.   Normal appearing tricuspid valve structure and function.   Moderate (2+) tricuspid valve regurgitation is present.   Moderate pulmonary hypertension.   Normal appearing pulmonic valve structure and function.   Mild pulmonic valvular regurgitation (1+) is present.   The left ventricle is normal in size, wall thickness, wall motion and   contractility.   Estimated left ventricular ejection fraction is 55-60 %.   The left atrium is mildly dilated.   Normal appearing right ventricle structure and function.     Signature     ----------------------------------------------------------------   Electronically signed by Venugopal Palla, MD(Interpreting   physician) on 2018 05:53 PM   ----------------------------------------------------------------      < end of copied text >

## 2018-07-24 NOTE — PHYSICAL THERAPY INITIAL EVALUATION ADULT - IMPAIRMENTS FOUND, PT EVAL
aerobic capacity/endurance/cognitive impairment/gait, locomotion, and balance/muscle strength/ventilation and respiration/gas exchange

## 2018-07-24 NOTE — PHYSICAL THERAPY INITIAL EVALUATION ADULT - DISCHARGE DISPOSITION, PT EVAL
rehabilitation facility/home w/ assist/pending progress with PT and improved functional strength/endurance/home w/ home PT

## 2018-07-24 NOTE — PHYSICAL THERAPY INITIAL EVALUATION ADULT - DIAGNOSIS, PT EVAL
Hypoxic respiratory failure,sepsis due to HCAP BLLs,dyspnea,toxic metabolic encephalopathy,new onset AFIB c RVR Hypoxemic respiratory failure, leukocytosis, +sepsis due to HCAP BLLs, dyspnea, toxic metabolic encephalopathy, new onset AFIB c RVR, normocytic anemia

## 2018-07-24 NOTE — PHYSICAL THERAPY INITIAL EVALUATION ADULT - CRITERIA FOR SKILLED THERAPEUTIC INTERVENTIONS
functional limitations in following categories/predicted duration of therapy intervention/impairments found/risk reduction/prevention/therapy frequency/anticipated equipment needs at discharge/anticipated discharge recommendation/rehab potential

## 2018-07-25 LAB
ANION GAP SERPL CALC-SCNC: 12 MMOL/L — SIGNIFICANT CHANGE UP (ref 5–17)
BUN SERPL-MCNC: 36 MG/DL — HIGH (ref 7–23)
CALCIUM SERPL-MCNC: 8.5 MG/DL — SIGNIFICANT CHANGE UP (ref 8.5–10.1)
CHLORIDE SERPL-SCNC: 111 MMOL/L — HIGH (ref 96–108)
CO2 SERPL-SCNC: 20 MMOL/L — LOW (ref 22–31)
CREAT SERPL-MCNC: 1.02 MG/DL — SIGNIFICANT CHANGE UP (ref 0.5–1.3)
GLUCOSE BLDC GLUCOMTR-MCNC: 141 MG/DL — HIGH (ref 70–99)
GLUCOSE BLDC GLUCOMTR-MCNC: 146 MG/DL — HIGH (ref 70–99)
GLUCOSE BLDC GLUCOMTR-MCNC: 167 MG/DL — HIGH (ref 70–99)
GLUCOSE BLDC GLUCOMTR-MCNC: 175 MG/DL — HIGH (ref 70–99)
GLUCOSE SERPL-MCNC: 115 MG/DL — HIGH (ref 70–99)
HCT VFR BLD CALC: 28 % — LOW (ref 34.5–45)
HGB BLD-MCNC: 9.3 G/DL — LOW (ref 11.5–15.5)
INR BLD: 1.39 RATIO — HIGH (ref 0.88–1.16)
MCHC RBC-ENTMCNC: 32.7 PG — SIGNIFICANT CHANGE UP (ref 27–34)
MCHC RBC-ENTMCNC: 33.2 GM/DL — SIGNIFICANT CHANGE UP (ref 32–36)
MCV RBC AUTO: 98.6 FL — SIGNIFICANT CHANGE UP (ref 80–100)
NRBC # BLD: 0 /100 WBCS — SIGNIFICANT CHANGE UP (ref 0–0)
PLATELET # BLD AUTO: 216 K/UL — SIGNIFICANT CHANGE UP (ref 150–400)
POTASSIUM SERPL-MCNC: 3.8 MMOL/L — SIGNIFICANT CHANGE UP (ref 3.5–5.3)
POTASSIUM SERPL-SCNC: 3.8 MMOL/L — SIGNIFICANT CHANGE UP (ref 3.5–5.3)
PROTHROM AB SERPL-ACNC: 15.1 SEC — HIGH (ref 9.8–12.7)
RBC # BLD: 2.84 M/UL — LOW (ref 3.8–5.2)
RBC # FLD: 14.2 % — SIGNIFICANT CHANGE UP (ref 10.3–14.5)
SODIUM SERPL-SCNC: 143 MMOL/L — SIGNIFICANT CHANGE UP (ref 135–145)
WBC # BLD: 9.59 K/UL — SIGNIFICANT CHANGE UP (ref 3.8–10.5)
WBC # FLD AUTO: 9.59 K/UL — SIGNIFICANT CHANGE UP (ref 3.8–10.5)

## 2018-07-25 RX ADMIN — Medication 1000 UNIT(S): at 11:21

## 2018-07-25 RX ADMIN — Medication 120 MILLIGRAM(S): at 17:46

## 2018-07-25 RX ADMIN — RISPERIDONE 0.25 MILLIGRAM(S): 4 TABLET ORAL at 11:21

## 2018-07-25 RX ADMIN — Medication 81 MILLIGRAM(S): at 11:21

## 2018-07-25 RX ADMIN — MEMANTINE HYDROCHLORIDE 10 MILLIGRAM(S): 10 TABLET ORAL at 17:55

## 2018-07-25 RX ADMIN — ALBUTEROL 2.5 MILLIGRAM(S): 90 AEROSOL, METERED ORAL at 14:28

## 2018-07-25 RX ADMIN — Medication 100 MILLIGRAM(S): at 21:22

## 2018-07-25 RX ADMIN — ENOXAPARIN SODIUM 60 MILLIGRAM(S): 100 INJECTION SUBCUTANEOUS at 05:54

## 2018-07-25 RX ADMIN — Medication 40 MILLIGRAM(S): at 05:54

## 2018-07-25 RX ADMIN — Medication 1: at 11:18

## 2018-07-25 RX ADMIN — MEMANTINE HYDROCHLORIDE 10 MILLIGRAM(S): 10 TABLET ORAL at 05:54

## 2018-07-25 RX ADMIN — CEFEPIME 1000 MILLIGRAM(S): 1 INJECTION, POWDER, FOR SOLUTION INTRAMUSCULAR; INTRAVENOUS at 17:55

## 2018-07-25 RX ADMIN — CEFEPIME 1000 MILLIGRAM(S): 1 INJECTION, POWDER, FOR SOLUTION INTRAMUSCULAR; INTRAVENOUS at 05:54

## 2018-07-25 RX ADMIN — Medication 100 MILLIGRAM(S): at 17:55

## 2018-07-25 RX ADMIN — Medication 25 MICROGRAM(S): at 05:54

## 2018-07-25 RX ADMIN — DONEPEZIL HYDROCHLORIDE 10 MILLIGRAM(S): 10 TABLET, FILM COATED ORAL at 21:23

## 2018-07-25 RX ADMIN — ALBUTEROL 2.5 MILLIGRAM(S): 90 AEROSOL, METERED ORAL at 20:15

## 2018-07-25 RX ADMIN — SERTRALINE 100 MILLIGRAM(S): 25 TABLET, FILM COATED ORAL at 11:21

## 2018-07-25 RX ADMIN — ALBUTEROL 2.5 MILLIGRAM(S): 90 AEROSOL, METERED ORAL at 01:47

## 2018-07-25 RX ADMIN — Medication 100 MILLIGRAM(S): at 05:55

## 2018-07-25 RX ADMIN — WARFARIN SODIUM 5 MILLIGRAM(S): 2.5 TABLET ORAL at 21:22

## 2018-07-25 RX ADMIN — ATORVASTATIN CALCIUM 10 MILLIGRAM(S): 80 TABLET, FILM COATED ORAL at 21:22

## 2018-07-25 RX ADMIN — RISPERIDONE 0.5 MILLIGRAM(S): 4 TABLET ORAL at 21:22

## 2018-07-25 RX ADMIN — LATANOPROST 1 DROP(S): 0.05 SOLUTION/ DROPS OPHTHALMIC; TOPICAL at 21:41

## 2018-07-25 RX ADMIN — ALBUTEROL 2.5 MILLIGRAM(S): 90 AEROSOL, METERED ORAL at 09:50

## 2018-07-25 NOTE — PROGRESS NOTE ADULT - SUBJECTIVE AND OBJECTIVE BOX
INTERVAL HPI/OVERNIGHT EVENTS: 89 y/o F PMHx significant for dementia, TIA, HTN, diabetes mellitus type 2, hyperlipidemia, who was BIBA from [facility] for further evaluation of increased confusion which began last night (7/21) associated with cough, and low grade fever today (7/22). At [facility] the patient was found to be increasingly dyspneic and hypoxic (per EMS SpO2 90% PTA). Labs => WBC 17.17, Hgb/Hct 10.9/32.7, K 3.3, BUN/Cr 44/1.47. CT Head => moderate anterior periventricular white matter ischemia. CXR revealed bilateral lower lobe infiltrates. In the ED the patient was found to be in AFib w/ RVR.      7/23: pt now in NSR, appears dyspneic able to follow commands and answer questions but hypoxic to 88 % on RA. diffuse audible wheezing noted in B/L lung fieldsPT received 150ml NS since admission for sepsis protocol  7/24: up and out of bed. having large quantity foul smelling stool suspicious for c diff  7/25: patient sitting comfortably in chair, complaining of feeling fatigued, diarrhea resolved on its own- c. diff negative    MEDICATIONS  (STANDING):  ALBUTerol    0.083% 2.5 milliGRAM(s) Nebulizer every 6 hours  aspirin enteric coated 81 milliGRAM(s) Oral daily  atorvastatin 10 milliGRAM(s) Oral at bedtime  cefepime  Injectable. 1000 milliGRAM(s) IV Push every 12 hours  cholecalciferol 1000 Unit(s) Oral daily  dextrose 5%. 1000 milliLiter(s) (50 mL/Hr) IV Continuous <Continuous>  dextrose 50% Injectable 12.5 Gram(s) IV Push once  dextrose 50% Injectable 25 Gram(s) IV Push once  dextrose 50% Injectable 25 Gram(s) IV Push once  diltiazem    milliGRAM(s) Oral daily  docusate sodium 100 milliGRAM(s) Oral three times a day  donepezil 10 milliGRAM(s) Oral at bedtime  doxycycline hyclate Capsule 100 milliGRAM(s) Oral every 12 hours  enoxaparin Injectable 60 milliGRAM(s) SubCutaneous every 24 hours  furosemide   Injectable 40 milliGRAM(s) IV Push daily  insulin lispro (HumaLOG) corrective regimen sliding scale   SubCutaneous three times a day before meals  latanoprost 0.005% Ophthalmic Solution 1 Drop(s) Both EYES at bedtime  levothyroxine 25 MICROGram(s) Oral daily  memantine 10 milliGRAM(s) Oral two times a day  risperiDONE   Tablet 0.25 milliGRAM(s) Oral daily  risperiDONE   Tablet 0.5 milliGRAM(s) Oral at bedtime  sertraline 100 milliGRAM(s) Oral daily  warfarin 5 milliGRAM(s) Oral daily    MEDICATIONS  (PRN):  acetaminophen   Tablet 650 milliGRAM(s) Oral every 6 hours PRN For Temp greater than 38.5 C (101.3 F)  dextrose 40% Gel 15 Gram(s) Oral once PRN Blood Glucose LESS THAN 70 milliGRAM(s)/deciliter  glucagon  Injectable 1 milliGRAM(s) IntraMuscular once PRN Glucose LESS THAN 70 milligrams/deciliter  guaiFENesin   Syrup  (Sugar-Free) 200 milliGRAM(s) Oral every 6 hours PRN Cough  ondansetron Injectable 4 milliGRAM(s) IV Push every 6 hours PRN Nausea  senna 2 Tablet(s) Oral at bedtime PRN Constipation      Allergies    penicillins (Unknown)  sulfa drugs (Unknown)    Intolerances      REVIEW OF SYSTEMS:    CONSTITUTIONAL: No weakness, fevers or chills  EYES/ENT: No visual changes;  No vertigo or throat pain   NECK: No pain or stiffness  RESPIRATORY: No cough, wheezing, hemoptysis; No shortness of breath  CARDIOVASCULAR: No chest pain or palpitations  GASTROINTESTINAL: No abdominal or epigastric pain. No nausea, vomiting, or hematemesis; No diarrhea or constipation. No melena or hematochezia.  GENITOURINARY: No dysuria, frequency or hematuria  NEUROLOGICAL: No numbness or weakness  SKIN: No itching, burning, rashes, or lesions   All other review of systems is negative unless indicated above    Vital Signs Last 24 Hrs  T(C): 36.5 (25 Jul 2018 15:58), Max: 37.3 (25 Jul 2018 00:03)  T(F): 97.7 (25 Jul 2018 15:58), Max: 99.1 (25 Jul 2018 00:03)  HR: 81 (25 Jul 2018 15:00) (57 - 86)  BP: 114/59 (25 Jul 2018 14:00) (91/62 - 125/46)  BP(mean): 72 (25 Jul 2018 14:00) (50 - 72)  RR: 21 (25 Jul 2018 15:00) (16 - 27)  SpO2: 96% (25 Jul 2018 09:50) (91% - 98%)    07-24 @ 07:01  -  07-25 @ 07:00  --------------------------------------------------------  IN: 960 mL / OUT: 100 mL / NET: 860 mL    07-25 @ 07:01 - 07-25 @ 17:52  --------------------------------------------------------  IN: 200 mL / OUT: 550 mL / NET: -350 mL        PHYSICAL EXAM:    Constitutional: NAD, awake and alert, well-developed  HEENT: PERR, EOMI, Normal Hearing, MMM  Neck: Soft and supple, No LAD, No JVD  Respiratory: decreased breath sounds B/L bases  Cardiovascular: S1 and S2, regular rate and rhythm, no Murmurs, gallops or rubs  Gastrointestinal: Bowel Sounds present, soft, nontender, nondistended, no guarding, no rebound  Extremities: No peripheral edema  Vascular: 2+ peripheral pulses  Neurological: A/O x 2, no focal deficits  Musculoskeletal: 5/5 strength b/l upper and lower extremities  Skin: No rashes    LABS:                        9.3    9.59  )-----------( 216      ( 25 Jul 2018 05:58 )             28.0     07-25    143  |  111<H>  |  36<H>  ----------------------------<  115<H>  3.8   |  20<L>  |  1.02    Ca    8.5      25 Jul 2018 05:58      PT/INR - ( 25 Jul 2018 05:58 )   PT: 15.1 sec;   INR: 1.39 ratio               RADIOLOGY & ADDITIONAL TESTS:

## 2018-07-25 NOTE — PROGRESS NOTE ADULT - SUBJECTIVE AND OBJECTIVE BOX
Patient is a 89y old  Female who presents with a chief complaint of Shortness of Breath (22 Jul 2018 23:40)      Date of service: 07-25-18 @ 16:24    Patient sitting in chair  No complaints        ROS: no fever or chills; denies dizziness, no HA, no SOB or cough, no abdominal pain, no diarrhea or constipation; no dysuria, no urinary frequency, no legs pain, no rashes    MEDICATIONS  (STANDING):  ALBUTerol    0.083% 2.5 milliGRAM(s) Nebulizer every 6 hours  aspirin enteric coated 81 milliGRAM(s) Oral daily  atorvastatin 10 milliGRAM(s) Oral at bedtime  cefepime  Injectable. 1000 milliGRAM(s) IV Push every 12 hours  cholecalciferol 1000 Unit(s) Oral daily  dextrose 5%. 1000 milliLiter(s) (50 mL/Hr) IV Continuous <Continuous>  dextrose 50% Injectable 12.5 Gram(s) IV Push once  dextrose 50% Injectable 25 Gram(s) IV Push once  dextrose 50% Injectable 25 Gram(s) IV Push once  diltiazem    milliGRAM(s) Oral daily  docusate sodium 100 milliGRAM(s) Oral three times a day  donepezil 10 milliGRAM(s) Oral at bedtime  doxycycline hyclate Capsule 100 milliGRAM(s) Oral every 12 hours  enoxaparin Injectable 60 milliGRAM(s) SubCutaneous every 24 hours  furosemide   Injectable 40 milliGRAM(s) IV Push daily  insulin lispro (HumaLOG) corrective regimen sliding scale   SubCutaneous three times a day before meals  latanoprost 0.005% Ophthalmic Solution 1 Drop(s) Both EYES at bedtime  levothyroxine 25 MICROGram(s) Oral daily  memantine 10 milliGRAM(s) Oral two times a day  risperiDONE   Tablet 0.25 milliGRAM(s) Oral daily  risperiDONE   Tablet 0.5 milliGRAM(s) Oral at bedtime  sertraline 100 milliGRAM(s) Oral daily  warfarin 5 milliGRAM(s) Oral daily    MEDICATIONS  (PRN):  acetaminophen   Tablet 650 milliGRAM(s) Oral every 6 hours PRN For Temp greater than 38.5 C (101.3 F)  dextrose 40% Gel 15 Gram(s) Oral once PRN Blood Glucose LESS THAN 70 milliGRAM(s)/deciliter  glucagon  Injectable 1 milliGRAM(s) IntraMuscular once PRN Glucose LESS THAN 70 milligrams/deciliter  guaiFENesin   Syrup  (Sugar-Free) 200 milliGRAM(s) Oral every 6 hours PRN Cough  ondansetron Injectable 4 milliGRAM(s) IV Push every 6 hours PRN Nausea  senna 2 Tablet(s) Oral at bedtime PRN Constipation      Vital Signs Last 24 Hrs  T(C): 36.5 (25 Jul 2018 15:58), Max: 37.3 (25 Jul 2018 00:03)  T(F): 97.7 (25 Jul 2018 15:58), Max: 99.1 (25 Jul 2018 00:03)  HR: 81 (25 Jul 2018 15:00) (57 - 86)  BP: 114/59 (25 Jul 2018 14:00) (91/62 - 126/42)  BP(mean): 72 (25 Jul 2018 14:00) (50 - 72)  RR: 21 (25 Jul 2018 15:00) (16 - 27)  SpO2: 96% (25 Jul 2018 09:50) (91% - 98%)    Physical Exam:        Physical Exam:    PE:    Constitutional: frail looking  HEENT: NC/AT, EOMI, PERRLA, conjunctivae clear; ears and nose atraumatic; pharynx clear  Neck: supple; thyroid not palpable  Back: no tenderness  Respiratory: respiratory effort normal; bilateral rhonchi  Cardiovascular: S1S2 regular, no murmurs  Abdomen: soft, not tender, not distended, positive BS; no liver or spleen organomegaly  Genitourinary: no suprapubic tenderness  Musculoskeletal: no muscle tenderness, no joint swelling or tenderness  Neurological/ Psychiatric: confused judgement and insight normal;  moving all extremities  Skin: no rashes; no palpable lesions    Labs: all available labs reviewed                       Labs:                        Labs:                        9.3    9.59  )-----------( 216      ( 25 Jul 2018 05:58 )             28.0     07-25    143  |  111<H>  |  36<H>  ----------------------------<  115<H>  3.8   |  20<L>  |  1.02    Ca    8.5      25 Jul 2018 05:58             Cultures:       Culture - Blood (collected 07-22-18 @ 20:23)  Source: .Blood aerobic bottle only  Preliminary Report (07-23-18 @ 21:02):    No growth to date.    Culture - Urine (collected 07-22-18 @ 15:12)  Source: .Urine Clean Catch (Midstream)  Final Report (07-23-18 @ 22:21):    Culture grew 3 or more types of organisms which indicate    collection contamination; consider recollection only if clinically    indicated.    Culture - Blood (collected 07-22-18 @ 15:12)  Source: .Blood Blood-Peripheral  Preliminary Report (07-23-18 @ 21:02):    No growth to date.    Cdiff negative        < from: Xray Chest 1 View AP/PA. (07.22.18 @ 15:58) >    EXAM:  XR CHEST AP OR PA 1V                            PROCEDURE DATE:  07/22/2018          INTERPRETATION:  Clinical information: Fever and cough    Portable AP chest radiograph from 1536 hours:    COMPARISON:  May 24, 2016    FINDINGS:  Sternotomy wires are identified. Unremarkable cardiac   mediastinal contours. Patchy airspace opacities at both lung bases. No   pneumothorax or pleural effusion.    IMPRESSION: Suspicion for bilateral lower lobe pneumonia      < end of copied text >              Radiology: all available radiological tests reviewed    Advanced directives addressed: full resuscitation

## 2018-07-26 LAB
ANION GAP SERPL CALC-SCNC: 12 MMOL/L — SIGNIFICANT CHANGE UP (ref 5–17)
BUN SERPL-MCNC: 48 MG/DL — HIGH (ref 7–23)
CALCIUM SERPL-MCNC: 8.9 MG/DL — SIGNIFICANT CHANGE UP (ref 8.5–10.1)
CHLORIDE SERPL-SCNC: 107 MMOL/L — SIGNIFICANT CHANGE UP (ref 96–108)
CO2 SERPL-SCNC: 22 MMOL/L — SIGNIFICANT CHANGE UP (ref 22–31)
CREAT SERPL-MCNC: 1.05 MG/DL — SIGNIFICANT CHANGE UP (ref 0.5–1.3)
GLUCOSE BLDC GLUCOMTR-MCNC: 149 MG/DL — HIGH (ref 70–99)
GLUCOSE BLDC GLUCOMTR-MCNC: 157 MG/DL — HIGH (ref 70–99)
GLUCOSE BLDC GLUCOMTR-MCNC: 161 MG/DL — HIGH (ref 70–99)
GLUCOSE BLDC GLUCOMTR-MCNC: 166 MG/DL — HIGH (ref 70–99)
GLUCOSE SERPL-MCNC: 133 MG/DL — HIGH (ref 70–99)
HCT VFR BLD CALC: 29.7 % — LOW (ref 34.5–45)
HGB BLD-MCNC: 9.9 G/DL — LOW (ref 11.5–15.5)
INR BLD: 1.51 RATIO — HIGH (ref 0.88–1.16)
MCHC RBC-ENTMCNC: 32.7 PG — SIGNIFICANT CHANGE UP (ref 27–34)
MCHC RBC-ENTMCNC: 33.3 GM/DL — SIGNIFICANT CHANGE UP (ref 32–36)
MCV RBC AUTO: 98 FL — SIGNIFICANT CHANGE UP (ref 80–100)
NRBC # BLD: 0 /100 WBCS — SIGNIFICANT CHANGE UP (ref 0–0)
OB PNL STL: NEGATIVE — SIGNIFICANT CHANGE UP
PLATELET # BLD AUTO: 242 K/UL — SIGNIFICANT CHANGE UP (ref 150–400)
POTASSIUM SERPL-MCNC: 3.5 MMOL/L — SIGNIFICANT CHANGE UP (ref 3.5–5.3)
POTASSIUM SERPL-SCNC: 3.5 MMOL/L — SIGNIFICANT CHANGE UP (ref 3.5–5.3)
PROTHROM AB SERPL-ACNC: 16.5 SEC — HIGH (ref 9.8–12.7)
RBC # BLD: 3.03 M/UL — LOW (ref 3.8–5.2)
RBC # FLD: 14.2 % — SIGNIFICANT CHANGE UP (ref 10.3–14.5)
SODIUM SERPL-SCNC: 141 MMOL/L — SIGNIFICANT CHANGE UP (ref 135–145)
WBC # BLD: 9.89 K/UL — SIGNIFICANT CHANGE UP (ref 3.8–10.5)
WBC # FLD AUTO: 9.89 K/UL — SIGNIFICANT CHANGE UP (ref 3.8–10.5)

## 2018-07-26 RX ORDER — SODIUM CHLORIDE 9 MG/ML
1000 INJECTION INTRAMUSCULAR; INTRAVENOUS; SUBCUTANEOUS
Qty: 0 | Refills: 0 | Status: DISCONTINUED | OUTPATIENT
Start: 2018-07-26 | End: 2018-07-27

## 2018-07-26 RX ORDER — POTASSIUM CHLORIDE 20 MEQ
40 PACKET (EA) ORAL ONCE
Qty: 0 | Refills: 0 | Status: COMPLETED | OUTPATIENT
Start: 2018-07-26 | End: 2018-07-26

## 2018-07-26 RX ORDER — HYDROCORTISONE 1 %
1 OINTMENT (GRAM) TOPICAL
Qty: 0 | Refills: 0 | Status: DISCONTINUED | OUTPATIENT
Start: 2018-07-26 | End: 2018-07-27

## 2018-07-26 RX ORDER — CEFUROXIME AXETIL 250 MG
250 TABLET ORAL EVERY 12 HOURS
Qty: 0 | Refills: 0 | Status: DISCONTINUED | OUTPATIENT
Start: 2018-07-26 | End: 2018-07-27

## 2018-07-26 RX ADMIN — Medication 1000 UNIT(S): at 11:56

## 2018-07-26 RX ADMIN — Medication 100 MILLIGRAM(S): at 05:31

## 2018-07-26 RX ADMIN — ALBUTEROL 2.5 MILLIGRAM(S): 90 AEROSOL, METERED ORAL at 14:43

## 2018-07-26 RX ADMIN — Medication 25 MICROGRAM(S): at 05:32

## 2018-07-26 RX ADMIN — ATORVASTATIN CALCIUM 10 MILLIGRAM(S): 80 TABLET, FILM COATED ORAL at 23:51

## 2018-07-26 RX ADMIN — ALBUTEROL 2.5 MILLIGRAM(S): 90 AEROSOL, METERED ORAL at 19:43

## 2018-07-26 RX ADMIN — Medication 40 MILLIGRAM(S): at 05:32

## 2018-07-26 RX ADMIN — MEMANTINE HYDROCHLORIDE 10 MILLIGRAM(S): 10 TABLET ORAL at 05:32

## 2018-07-26 RX ADMIN — Medication 1: at 07:56

## 2018-07-26 RX ADMIN — WARFARIN SODIUM 5 MILLIGRAM(S): 2.5 TABLET ORAL at 23:50

## 2018-07-26 RX ADMIN — Medication 100 MILLIGRAM(S): at 15:02

## 2018-07-26 RX ADMIN — SODIUM CHLORIDE 40 MILLILITER(S): 9 INJECTION INTRAMUSCULAR; INTRAVENOUS; SUBCUTANEOUS at 14:23

## 2018-07-26 RX ADMIN — Medication 120 MILLIGRAM(S): at 05:31

## 2018-07-26 RX ADMIN — Medication 40 MILLIEQUIVALENT(S): at 11:44

## 2018-07-26 RX ADMIN — Medication 1: at 11:45

## 2018-07-26 RX ADMIN — MEMANTINE HYDROCHLORIDE 10 MILLIGRAM(S): 10 TABLET ORAL at 17:41

## 2018-07-26 RX ADMIN — SERTRALINE 100 MILLIGRAM(S): 25 TABLET, FILM COATED ORAL at 11:44

## 2018-07-26 RX ADMIN — Medication 250 MILLIGRAM(S): at 17:42

## 2018-07-26 RX ADMIN — Medication 100 MILLIGRAM(S): at 17:42

## 2018-07-26 RX ADMIN — RISPERIDONE 0.25 MILLIGRAM(S): 4 TABLET ORAL at 11:44

## 2018-07-26 RX ADMIN — ALBUTEROL 2.5 MILLIGRAM(S): 90 AEROSOL, METERED ORAL at 01:09

## 2018-07-26 RX ADMIN — ALBUTEROL 2.5 MILLIGRAM(S): 90 AEROSOL, METERED ORAL at 08:03

## 2018-07-26 RX ADMIN — RISPERIDONE 0.5 MILLIGRAM(S): 4 TABLET ORAL at 23:51

## 2018-07-26 RX ADMIN — ENOXAPARIN SODIUM 60 MILLIGRAM(S): 100 INJECTION SUBCUTANEOUS at 05:31

## 2018-07-26 RX ADMIN — DONEPEZIL HYDROCHLORIDE 10 MILLIGRAM(S): 10 TABLET, FILM COATED ORAL at 23:50

## 2018-07-26 RX ADMIN — Medication 1 APPLICATION(S): at 17:39

## 2018-07-26 RX ADMIN — LATANOPROST 1 DROP(S): 0.05 SOLUTION/ DROPS OPHTHALMIC; TOPICAL at 23:52

## 2018-07-26 RX ADMIN — CEFEPIME 1000 MILLIGRAM(S): 1 INJECTION, POWDER, FOR SOLUTION INTRAMUSCULAR; INTRAVENOUS at 05:32

## 2018-07-26 RX ADMIN — Medication 100 MILLIGRAM(S): at 23:51

## 2018-07-26 RX ADMIN — Medication 81 MILLIGRAM(S): at 11:44

## 2018-07-26 NOTE — PROGRESS NOTE ADULT - SUBJECTIVE AND OBJECTIVE BOX
Patient is a 89y old  Female who presents with a chief complaint of Shortness of Breath (22 Jul 2018 23:40)    Date of service: 07-26-18 @ 15:32    Patient sitting in chair  Tolerating diet  Occassionally coughing  Afebrile        ROS: no fever or chills; denies dizziness, no HA, no SOB, no abdominal pain, no diarrhea or constipation; no dysuria, no urinary frequency, no legs pain, no rashes    MEDICATIONS  (STANDING):  ALBUTerol    0.083% 2.5 milliGRAM(s) Nebulizer every 6 hours  aspirin enteric coated 81 milliGRAM(s) Oral daily  atorvastatin 10 milliGRAM(s) Oral at bedtime  cefuroxime   Tablet 250 milliGRAM(s) Oral every 12 hours  cholecalciferol 1000 Unit(s) Oral daily  dextrose 5%. 1000 milliLiter(s) (50 mL/Hr) IV Continuous <Continuous>  dextrose 50% Injectable 12.5 Gram(s) IV Push once  dextrose 50% Injectable 25 Gram(s) IV Push once  dextrose 50% Injectable 25 Gram(s) IV Push once  diltiazem    milliGRAM(s) Oral daily  docusate sodium 100 milliGRAM(s) Oral three times a day  donepezil 10 milliGRAM(s) Oral at bedtime  doxycycline hyclate Capsule 100 milliGRAM(s) Oral every 12 hours  enoxaparin Injectable 60 milliGRAM(s) SubCutaneous every 24 hours  insulin lispro (HumaLOG) corrective regimen sliding scale   SubCutaneous three times a day before meals  latanoprost 0.005% Ophthalmic Solution 1 Drop(s) Both EYES at bedtime  levothyroxine 25 MICROGram(s) Oral daily  memantine 10 milliGRAM(s) Oral two times a day  risperiDONE   Tablet 0.25 milliGRAM(s) Oral daily  risperiDONE   Tablet 0.5 milliGRAM(s) Oral at bedtime  sertraline 100 milliGRAM(s) Oral daily  sodium chloride 0.9%. 1000 milliLiter(s) (40 mL/Hr) IV Continuous <Continuous>  warfarin 5 milliGRAM(s) Oral daily    MEDICATIONS  (PRN):  acetaminophen   Tablet 650 milliGRAM(s) Oral every 6 hours PRN For Temp greater than 38.5 C (101.3 F)  dextrose 40% Gel 15 Gram(s) Oral once PRN Blood Glucose LESS THAN 70 milliGRAM(s)/deciliter  glucagon  Injectable 1 milliGRAM(s) IntraMuscular once PRN Glucose LESS THAN 70 milligrams/deciliter  guaiFENesin   Syrup  (Sugar-Free) 200 milliGRAM(s) Oral every 6 hours PRN Cough  ondansetron Injectable 4 milliGRAM(s) IV Push every 6 hours PRN Nausea  senna 2 Tablet(s) Oral at bedtime PRN Constipation      Vital Signs Last 24 Hrs  T(C): 36.7 (26 Jul 2018 14:22), Max: 36.9 (26 Jul 2018 07:30)  T(F): 98.1 (26 Jul 2018 14:22), Max: 98.4 (26 Jul 2018 07:30)  HR: 70 (26 Jul 2018 14:43) (61 - 85)  BP: 99/51 (26 Jul 2018 11:00) (89/33 - 136/51)  BP(mean): 64 (26 Jul 2018 11:00) (45 - 78)  RR: 16 (26 Jul 2018 11:00) (15 - 32)  SpO2: 100% (26 Jul 2018 11:00) (94% - 100%)    Physical Exam:        Physical Exam:    PE:    Constitutional: frail looking  HEENT: NC/AT, EOMI, PERRLA, conjunctivae clear; ears and nose atraumatic; pharynx clear  Neck: supple; thyroid not palpable  Back: no tenderness  Respiratory: respiratory effort normal; scattered coarse breath sounds  Cardiovascular: S1S2 regular, no murmurs  Abdomen: soft, not tender, not distended, positive BS; no liver or spleen organomegaly  Genitourinary: no suprapubic tenderness  Musculoskeletal: no muscle tenderness, no joint swelling or tenderness  Neurological/ Psychiatric: confused judgement and insight normal;  moving all extremities  Skin: no rashes; no palpable lesions    Labs: all available labs reviewed                        Labs:                        9.9    9.89  )-----------( 242      ( 26 Jul 2018 05:20 )             29.7     07-26    141  |  107  |  48<H>  ----------------------------<  133<H>  3.5   |  22  |  1.05    Ca    8.9      26 Jul 2018 05:20             Cultures:       Culture - Blood (collected 07-22-18 @ 20:23)  Source: .Blood aerobic bottle only  Preliminary Report (07-23-18 @ 21:02):    No growth to date.    Culture - Urine (collected 07-22-18 @ 15:12)  Source: .Urine Clean Catch (Midstream)  Final Report (07-23-18 @ 22:21):    Culture grew 3 or more types of organisms which indicate    collection contamination; consider recollection only if clinically    indicated.    Culture - Blood (collected 07-22-18 @ 15:12)  Source: .Blood Blood-Peripheral  Preliminary Report (07-23-18 @ 21:02):    No growth to date.            Cdiff negative        < from: Xray Chest 1 View AP/PA. (07.22.18 @ 15:58) >    EXAM:  XR CHEST AP OR PA 1V                            PROCEDURE DATE:  07/22/2018          INTERPRETATION:  Clinical information: Fever and cough    Portable AP chest radiograph from 1536 hours:    COMPARISON:  May 24, 2016    FINDINGS:  Sternotomy wires are identified. Unremarkable cardiac   mediastinal contours. Patchy airspace opacities at both lung bases. No   pneumothorax or pleural effusion.    IMPRESSION: Suspicion for bilateral lower lobe pneumonia      < end of copied text >              Radiology: all available radiological tests reviewed    Advanced directives addressed: full resuscitation

## 2018-07-26 NOTE — CDI QUERY NOTE - NSCDIOTHERTXTBX_GEN_ALL_CORE_HH
89 y/o female  BIBA from [facility] for further evaluation of increased confusion which began last night (7/21),  in addition to sepsis. Physician also had pt on Cefepime and doxy for suspected GN felecia pna.    Please  clarify  the abbreviation "GN felecia pna"  if applicable below    A) Gram negative felecia pneumonia  B) GN - not gram negative felecia pneumonia  C) Other please specify  D) Clinically insignificant      ( PS. GN  and GP are no longer acceptable abbreviations)

## 2018-07-26 NOTE — PROGRESS NOTE ADULT - ASSESSMENT
89 y/o F PMHx significant for dementia, TIA, HTN, diabetes mellitus type 2, hyperlipidemia, recent hospitalization at [facility] for dizziness, admitted from snf on 7/22 for evaluation of increased shortness of breath, cough and confusion; upon admission the patient was found to be in rapid afib. History per medical record and family at bedside as patient is poor historian.  1. Patient admitted with pneumonia which will consider as health care associated pneumonia given recent hospitalization and transfer from snf; also noted with leukocytosis most likely reactive to pneumonia  - patient at risk for gram negative rods and other resistant bacteria   - oxygen and nebs as needed   - serial cbc and monitor temperature   - reviewed prior medical records to evaluate for resistant or atypical pathogens   - day #3 doxycycline and cefepime  - tolerating antibiotics without rashes or side effects   - Monitor closely in view of history of penicillin allergy   - to have stool checked for cdiff--negative  2. other issues; dementia, TIA, HTN, diabetes mellitus type 2, hyperlipidemia  - per medicine
89 y/o F PMHx significant for dementia, TIA, HTN, diabetes mellitus type 2, hyperlipidemia, recent hospitalization at [facility] for dizziness, admitted from snf on 7/22 for evaluation of increased shortness of breath, cough and confusion; upon admission the patient was found to be in rapid afib. History per medical record and family at bedside as patient is poor historian.  1. Patient admitted with pneumonia which will consider as health care associated pneumonia given recent hospitalization and transfer from snf; also noted with leukocytosis most likely reactive to pneumonia  - patient at risk for gram negative rods and other resistant bacteria   - oxygen and nebs as needed   - serial cbc and monitor temperature   - reviewed prior medical records to evaluate for resistant or atypical pathogens   - day #2 doxycycline and cefepime  - tolerating antibiotics without rashes or side effects   - Monitor closely in view of history of penicillin allergy   - to have stool checked for cdiff  2. other issues; dementia, TIA, HTN, diabetes mellitus type 2, hyperlipidemia  - per medicine
89 y/o F PMHx significant for dementia, TIA, HTN, diabetes mellitus type 2, hyperlipidemia, recent hospitalization at [facility] for dizziness, admitted from snf on 7/22 for evaluation of increased shortness of breath, cough and confusion; upon admission the patient was found to be in rapid afib. History per medical record and family at bedside as patient is poor historian.  1. Patient admitted with pneumonia which will consider as health care associated pneumonia given recent hospitalization and transfer from snf; also noted with leukocytosis most likely reactive to pneumonia  - patient at risk for gram negative rods and other resistant bacteria   - oxygen and nebs as needed   - serial cbc and monitor temperature   - reviewed prior medical records to evaluate for resistant or atypical pathogens   - day #4 doxycycline and cefepime  - will change cefepime to ceftin 250 mg po q 12 hours  - complete 6 more days ceftin and doxycyline  - tolerating antibiotics without rashes or side effects   - Monitor closely in view of history of penicillin allergy   - to have stool checked for cdiff--negative  2. other issues; dementia, TIA, HTN, diabetes mellitus type 2, hyperlipidemia  - per medicine  If further ID issues please reconsult
89 y/o F PMHx significant for dementia, TIA, HTN, diabetes mellitus type 2, hyperlipidemia, who was BIBA from [facility] for further evaluation of increased confusion which began last night (7/21) associated with cough, and low grade fever today (7/22). At [facility] the patient was found to be increasingly dyspneic and hypoxic (per EMS SpO2 90% PTA). Labs => WBC 17.17, Hgb/Hct 10.9/32.7, K 3.3, BUN/Cr 44/1.47. CT Head => moderate anterior periventricular white matter ischemia. CXR revealed bilateral lower lobe infiltrates. In the ED the patient was found to be in AFib w/ RVR.      A:  Hypoxemic respiratory failure  Sepsis 2/2 to HCAP  Afib with RVR-new onset  toxic metabolic encephalopathy  Normocytic anemia (last recorded Hgb 10.9)  Dementia  HTN/hyperlipdemia        P:  Continue care in CCU  Cefepime and doxy for suspected GN felecia pna and doxy for atypical coverage. Blood cultures.  Repeat stat CXR now, nebs and stat dose lasix appears fluid overloaded. Check BNP and echo  Cont cardizem-> now in NSR. LMWH and start coumadin low dose.   check iron studies for underlying anemia. No signs of bleeding at present.            Advance Care Planning  ~advance directives were discussed extensively at the patient's bedside with family and Dr Valera.  The patient wishes at this time are to be both DNR. The following medical interventions are agreeable to the patient including use of IV fluids, and antibiotics.
89 y/o F PMHx significant for dementia, TIA, HTN, diabetes mellitus type 2, hyperlipidemia, who was BIBA from [facility] for further evaluation of increased confusion which began last night (7/21) associated with cough, and low grade fever today (7/22). At [facility] the patient was found to be increasingly dyspneic and hypoxic (per EMS SpO2 90% PTA). Labs => WBC 17.17, Hgb/Hct 10.9/32.7, K 3.3, BUN/Cr 44/1.47. CT Head => moderate anterior periventricular white matter ischemia. CXR revealed bilateral lower lobe infiltrates. In the ED the patient was found to be in AFib w/ RVR.      A:  Hypoxemic respiratory failure  Sepsis 2/2 to HCAP  Afib with RVR-new onset  toxic metabolic encephalopathy  Diarrhea  Normocytic anemia (last recorded Hgb 10.9)  Dementia  HTN/hyperlipdemia        P:  Cefepime and doxy for suspected GN felecia pna and doxy for atypical coverage. Blood cultures.  Acute decompensated diastolic CHF-cont IV lasix. BMP in am  Send stool for c diff. Flagyl 500 TID  Cont cardizem-> now in NSR. LMWH and start coumadin low dose. INR in am  check iron studies for underlying anemia. No signs of bleeding at present.            Advance Care Planning  ~advance directives were discussed extensively at the patient's bedside with family and Dr Valera.  The patient wishes at this time are to be both DNR. The following medical interventions are agreeable to the patient including use of IV fluids, and antibiotics.
89 y/o F PMHx significant for dementia, TIA, HTN, diabetes mellitus type 2, hyperlipidemia, who was BIBA from [facility] for further evaluation of increased confusion which began last night (7/21) associated with cough, and low grade fever today (7/22). At [facility] the patient was found to be increasingly dyspneic and hypoxic (per EMS SpO2 90% PTA). Labs => WBC 17.17, Hgb/Hct 10.9/32.7, K 3.3, BUN/Cr 44/1.47. CT Head => moderate anterior periventricular white matter ischemia. CXR revealed bilateral lower lobe infiltrates. In the ED the patient was found to be in AFib w/ RVR.      *Hypoxemic respiratory failure  -resolved  -continue albuterol nebs    *Sepsis 2/2 to HCAP  -Continue doxycycline and cefepime  -Appreciate ID recommendations  -F/U cultures    *Afib with RVR-new onset  -rate control with cardizem  -Bridge coumadin with lovenox- will get coumadin 5mg tonight- f/u pt/inr in AM    *Acute decompensated diastolic CHF  -Continue lasix IV    *Normocytic anemia   -Iron studies  -No active signs of bleeding    *Dementia  -Continue memantine, aricept    *HTN  -Continue cardizem    *hyperlipdemia  -Continue statin
89 y/o F PMHx significant for dementia, TIA, HTN, diabetes mellitus type 2, hyperlipidemia, who was BIBA from [facility] for further evaluation of increased confusion which began last night (7/21) associated with cough, and low grade fever today (7/22). At [facility] the patient was found to be increasingly dyspneic and hypoxic (per EMS SpO2 90% PTA). Labs => WBC 17.17, Hgb/Hct 10.9/32.7, K 3.3, BUN/Cr 44/1.47. CT Head => moderate anterior periventricular white matter ischemia. CXR revealed bilateral lower lobe infiltrates. In the ED the patient was found to be in AFib w/ RVR.      *Hypoxemic respiratory failure  -resolved  -continue albuterol nebs    *Sepsis 2/2 to HCAP  -Continue doxycycline and ceftin for possible Gram negative felecia pneumonia (present on admission)  -Appreciate ID recommendations  -F/U cultures    *Afib with RVR-new onset  -rate control with cardizem  -Bridge coumadin with lovenox- will get coumadin 5mg tonight- f/u pt/inr in AM    *Acute decompensated diastolic CHF  -Patient appears euvolemic and with BUN rising, will give gentle hydration and reassess need for diuresis in AM    *Normocytic anemia   -Iron studies  -No active signs of bleeding    *Dementia  -Continue memantine, aricept    *HTN  -Continue cardizem    *hyperlipdemia  -Continue statin    *Dispo  -Transfer to Med/Surg
SOB- likely secondary to pneumonia.  Fever and leucocytosis.  Management pre primary team.   she does not appear volume overloaded grossly.    Ejection systolic murmur- Mild aortic stenosis.   hypotension- resolved with IVF.   Likely from sepsis.    Atrial fibrillation- newly diagnosed.  CHADVasc score of at least 5.  Recommend full dose anticoagulation with NOVACs or coumadin.     Other medical issues- Management per primary team.   Thank you for allowing me to participate in the care of this patient. Please feel free to contact me with any questions.
SOB- likely secondary to pneumonia.  Fever and leucocytosis.  Management pre primary team.   she does not appear volume overloaded grossly.    Ejection systolic murmur- Mild aortic stenosis.   hypotension- resolved with IVF.   Likely from sepsis.    Atrial fibrillation- newly diagnosed.  CHADVasc score of at least 5. Continue full dose anticoagulation with coumadin.     will sign off for now, please call us with questions.     Other medical issues- Management per primary team.   Thank you for allowing me to participate in the care of this patient. Please feel free to contact me with any questions.

## 2018-07-26 NOTE — PROGRESS NOTE ADULT - SUBJECTIVE AND OBJECTIVE BOX
INTERVAL HPI/OVERNIGHT EVENTS: 89 y/o F PMHx significant for dementia, TIA, HTN, diabetes mellitus type 2, hyperlipidemia, who was BIBA from [facility] for further evaluation of increased confusion which began last night (7/21) associated with cough, and low grade fever today (7/22). At [facility] the patient was found to be increasingly dyspneic and hypoxic (per EMS SpO2 90% PTA). Labs => WBC 17.17, Hgb/Hct 10.9/32.7, K 3.3, BUN/Cr 44/1.47. CT Head => moderate anterior periventricular white matter ischemia. CXR revealed bilateral lower lobe infiltrates. In the ED the patient was found to be in AFib w/ RVR.      7/23: pt now in NSR, appears dyspneic able to follow commands and answer questions but hypoxic to 88 % on RA. diffuse audible wheezing noted in B/L lung fieldsPT received 150ml NS since admission for sepsis protocol  7/24: up and out of bed. having large quantity foul smelling stool suspicious for c diff  7/25: patient sitting comfortably in chair, complaining of feeling fatigued, diarrhea resolved on its own- c. diff negative  7/26: patient sitting comfortably in chair, complaining of fatigue, no other complaints at this time.     MEDICATIONS  (STANDING):  ALBUTerol    0.083% 2.5 milliGRAM(s) Nebulizer every 6 hours  aspirin enteric coated 81 milliGRAM(s) Oral daily  atorvastatin 10 milliGRAM(s) Oral at bedtime  cefuroxime   Tablet 250 milliGRAM(s) Oral every 12 hours  cholecalciferol 1000 Unit(s) Oral daily  dextrose 5%. 1000 milliLiter(s) (50 mL/Hr) IV Continuous <Continuous>  dextrose 50% Injectable 12.5 Gram(s) IV Push once  dextrose 50% Injectable 25 Gram(s) IV Push once  dextrose 50% Injectable 25 Gram(s) IV Push once  diltiazem    milliGRAM(s) Oral daily  docusate sodium 100 milliGRAM(s) Oral three times a day  donepezil 10 milliGRAM(s) Oral at bedtime  doxycycline hyclate Capsule 100 milliGRAM(s) Oral every 12 hours  enoxaparin Injectable 60 milliGRAM(s) SubCutaneous every 24 hours  hydrocortisone 0.5% Cream 1 Application(s) Topical two times a day  insulin lispro (HumaLOG) corrective regimen sliding scale   SubCutaneous three times a day before meals  latanoprost 0.005% Ophthalmic Solution 1 Drop(s) Both EYES at bedtime  levothyroxine 25 MICROGram(s) Oral daily  memantine 10 milliGRAM(s) Oral two times a day  risperiDONE   Tablet 0.25 milliGRAM(s) Oral daily  risperiDONE   Tablet 0.5 milliGRAM(s) Oral at bedtime  sertraline 100 milliGRAM(s) Oral daily  sodium chloride 0.9%. 1000 milliLiter(s) (40 mL/Hr) IV Continuous <Continuous>  warfarin 5 milliGRAM(s) Oral daily    MEDICATIONS  (PRN):  acetaminophen   Tablet 650 milliGRAM(s) Oral every 6 hours PRN For Temp greater than 38.5 C (101.3 F)  dextrose 40% Gel 15 Gram(s) Oral once PRN Blood Glucose LESS THAN 70 milliGRAM(s)/deciliter  glucagon  Injectable 1 milliGRAM(s) IntraMuscular once PRN Glucose LESS THAN 70 milligrams/deciliter  guaiFENesin   Syrup  (Sugar-Free) 200 milliGRAM(s) Oral every 6 hours PRN Cough  ondansetron Injectable 4 milliGRAM(s) IV Push every 6 hours PRN Nausea  senna 2 Tablet(s) Oral at bedtime PRN Constipation      Allergies    penicillins (Unknown)  sulfa drugs (Unknown)    Intolerances          REVIEW OF SYSTEMS:  CONSTITUTIONAL: + weakness, no fevers or chills  EYES/ENT: No visual changes;  No vertigo or throat pain   NECK: No pain or stiffness  RESPIRATORY: No cough, wheezing, hemoptysis; No shortness of breath  CARDIOVASCULAR: No chest pain or palpitations  GASTROINTESTINAL: No abdominal or epigastric pain. No nausea, vomiting, or hematemesis; No diarrhea or constipation. No melena or hematochezia.  GENITOURINARY: No dysuria, frequency or hematuria  NEUROLOGICAL: No numbness  SKIN: No itching, burning, rashes, or lesions   All other review of systems is negative unless indicated above    Vital Signs Last 24 Hrs  T(C): 36.6 (26 Jul 2018 15:55), Max: 36.9 (26 Jul 2018 07:30)  T(F): 97.9 (26 Jul 2018 15:55), Max: 98.4 (26 Jul 2018 07:30)  HR: 75 (26 Jul 2018 15:55) (61 - 85)  BP: 107/48 (26 Jul 2018 15:55) (89/33 - 136/51)  BP(mean): 64 (26 Jul 2018 11:00) (45 - 78)  RR: 16 (26 Jul 2018 15:55) (15 - 32)  SpO2: 97% (26 Jul 2018 15:55) (94% - 100%)    07-25 @ 07:01  -  07-26 @ 07:00  --------------------------------------------------------  IN: 350 mL / OUT: 1400 mL / NET: -1050 mL    07-26 @ 07:01  -  07-26 @ 16:56  --------------------------------------------------------  IN: 200 mL / OUT: 0 mL / NET: 200 mL        PHYSICAL EXAM:  Constitutional: NAD, awake and alert, well-developed  HEENT: PERR, EOMI, Normal Hearing, MMM  Neck: Soft and supple, No LAD, No JVD  Respiratory: CTA B/L  Cardiovascular: S1 and S2  Gastrointestinal: Bowel Sounds present, soft, nontender, nondistended, no guarding, no rebound  Extremities: No peripheral edema  Vascular: 2+ peripheral pulses  Neurological: A/O x 2, no focal deficits  Musculoskeletal: 5/5 strength b/l upper and lower extremities      LABS:                        9.9    9.89  )-----------( 242      ( 26 Jul 2018 05:20 )             29.7     07-26    141  |  107  |  48<H>  ----------------------------<  133<H>  3.5   |  22  |  1.05    Ca    8.9      26 Jul 2018 05:20      PT/INR - ( 26 Jul 2018 05:20 )   PT: 16.5 sec;   INR: 1.51 ratio               RADIOLOGY & ADDITIONAL TESTS: INTERVAL HPI/OVERNIGHT EVENTS: 89 y/o F PMHx significant for dementia, TIA, HTN, diabetes mellitus type 2, hyperlipidemia, who was BIBA from [facility] for further evaluation of increased confusion which began last night (7/21) associated with cough, and low grade fever today (7/22). At [facility] the patient was found to be increasingly dyspneic and hypoxic (per EMS SpO2 90% PTA). Labs => WBC 17.17, Hgb/Hct 10.9/32.7, K 3.3, BUN/Cr 44/1.47. CT Head => moderate anterior periventricular white matter ischemia. CXR revealed bilateral lower lobe infiltrates. In the ED the patient was found to be in AFib w/ RVR.      7/23: pt now in NSR, appears dyspneic able to follow commands and answer questions but hypoxic to 88 % on RA. diffuse audible wheezing noted in B/L lung fieldsPT received 150ml NS since admission for sepsis protocol  7/24: up and out of bed. having large quantity foul smelling stool suspicious for c diff  7/25: patient sitting comfortably in chair, complaining of feeling fatigued, diarrhea resolved on its own- c. diff negative  7/26: patient sitting comfortably in chair, complaining of fatigue, no other complaints at this time.     MEDICATIONS  (STANDING):  ALBUTerol    0.083% 2.5 milliGRAM(s) Nebulizer every 6 hours  aspirin enteric coated 81 milliGRAM(s) Oral daily  atorvastatin 10 milliGRAM(s) Oral at bedtime  cefuroxime   Tablet 250 milliGRAM(s) Oral every 12 hours  cholecalciferol 1000 Unit(s) Oral daily  dextrose 5%. 1000 milliLiter(s) (50 mL/Hr) IV Continuous <Continuous>  dextrose 50% Injectable 12.5 Gram(s) IV Push once  dextrose 50% Injectable 25 Gram(s) IV Push once  dextrose 50% Injectable 25 Gram(s) IV Push once  diltiazem    milliGRAM(s) Oral daily  docusate sodium 100 milliGRAM(s) Oral three times a day  donepezil 10 milliGRAM(s) Oral at bedtime  doxycycline hyclate Capsule 100 milliGRAM(s) Oral every 12 hours  enoxaparin Injectable 60 milliGRAM(s) SubCutaneous every 24 hours  hydrocortisone 0.5% Cream 1 Application(s) Topical two times a day  insulin lispro (HumaLOG) corrective regimen sliding scale   SubCutaneous three times a day before meals  latanoprost 0.005% Ophthalmic Solution 1 Drop(s) Both EYES at bedtime  levothyroxine 25 MICROGram(s) Oral daily  memantine 10 milliGRAM(s) Oral two times a day  risperiDONE   Tablet 0.25 milliGRAM(s) Oral daily  risperiDONE   Tablet 0.5 milliGRAM(s) Oral at bedtime  sertraline 100 milliGRAM(s) Oral daily  sodium chloride 0.9%. 1000 milliLiter(s) (40 mL/Hr) IV Continuous <Continuous>  warfarin 5 milliGRAM(s) Oral daily    MEDICATIONS  (PRN):  acetaminophen   Tablet 650 milliGRAM(s) Oral every 6 hours PRN For Temp greater than 38.5 C (101.3 F)  dextrose 40% Gel 15 Gram(s) Oral once PRN Blood Glucose LESS THAN 70 milliGRAM(s)/deciliter  glucagon  Injectable 1 milliGRAM(s) IntraMuscular once PRN Glucose LESS THAN 70 milligrams/deciliter  guaiFENesin   Syrup  (Sugar-Free) 200 milliGRAM(s) Oral every 6 hours PRN Cough  ondansetron Injectable 4 milliGRAM(s) IV Push every 6 hours PRN Nausea  senna 2 Tablet(s) Oral at bedtime PRN Constipation      Allergies    penicillins (Unknown)  sulfa drugs (Unknown)    Intolerances          REVIEW OF SYSTEMS:  CONSTITUTIONAL: + weakness, no fevers or chills  EYES/ENT: No visual changes;  No vertigo or throat pain   NECK: No pain or stiffness  RESPIRATORY: No cough, wheezing, hemoptysis; No shortness of breath  CARDIOVASCULAR: No chest pain or palpitations  GASTROINTESTINAL: No abdominal or epigastric pain. No nausea, vomiting, or hematemesis; No diarrhea or constipation. No melena or hematochezia.  GENITOURINARY: No dysuria, frequency or hematuria  NEUROLOGICAL: No numbness  SKIN: No itching, burning, rashes, or lesions   All other review of systems is negative unless indicated above    Vital Signs Last 24 Hrs  T(C): 36.6 (26 Jul 2018 15:55), Max: 36.9 (26 Jul 2018 07:30)  T(F): 97.9 (26 Jul 2018 15:55), Max: 98.4 (26 Jul 2018 07:30)  HR: 75 (26 Jul 2018 15:55) (61 - 85)  BP: 107/48 (26 Jul 2018 15:55) (89/33 - 136/51)  BP(mean): 64 (26 Jul 2018 11:00) (45 - 78)  RR: 16 (26 Jul 2018 15:55) (15 - 32)  SpO2: 97% (26 Jul 2018 15:55) (94% - 100%)    07-25 @ 07:01  -  07-26 @ 07:00  --------------------------------------------------------  IN: 350 mL / OUT: 1400 mL / NET: -1050 mL    07-26 @ 07:01  -  07-26 @ 16:56  --------------------------------------------------------  IN: 200 mL / OUT: 0 mL / NET: 200 mL        PHYSICAL EXAM:  Constitutional: NAD, awake and alert, well-developed  HEENT: PERR, EOMI, Normal Hearing, MMM  Neck: Soft and supple, No LAD, No JVD  Respiratory: CTA B/L  Cardiovascular: S1 and S2  Gastrointestinal: Bowel Sounds present, soft, nontender, nondistended, no guarding, no rebound  Extremities: No peripheral edema  Vascular: 2+ peripheral pulses  Neurological: A/O x 3, no focal deficits  Musculoskeletal: 5/5 strength b/l upper and lower extremities      LABS:                        9.9    9.89  )-----------( 242      ( 26 Jul 2018 05:20 )             29.7     07-26    141  |  107  |  48<H>  ----------------------------<  133<H>  3.5   |  22  |  1.05    Ca    8.9      26 Jul 2018 05:20      PT/INR - ( 26 Jul 2018 05:20 )   PT: 16.5 sec;   INR: 1.51 ratio               RADIOLOGY & ADDITIONAL TESTS:

## 2018-07-26 NOTE — PROGRESS NOTE ADULT - SUBJECTIVE AND OBJECTIVE BOX
Patient is a 89y old  Female who presents with a chief complaint of Shortness of Breath.    HPI:  89 y/o F PMHx significant for dementia, TIA, HTN, diabetes mellitus type 2, hyperlipidemia, who was BIBA from [facility] for further evaluation of increased confusion which began last night () associated with cough, and low grade fever today (). At [facility] the patient was found to be increasingly dyspneic and hypoxic (per EMS SpO2 90% PTA). Labs => WBC 17.17, Hgb/Hct 10.9/32.7, K 3.3, BUN/Cr 44/1.47. CT Head => moderate anterior periventricular white matter ischemia. CXR revealed bilateral lower lobe infiltrates. In the ED the patient was found to be in AFib w/ RVR.     Daughter at bedside now and stated that her mother had been having low grade fever and cough for few days.  She received IVF at the time of admission.    Pt states that she is feeling much better this am.  still cough.    18- pt seen and examined by me today. She denies any symptoms.   - Pt seen and examined this am . c/o fatigue.       PAST MEDICAL & SURGICAL HISTORY:  Dyslipidemia  Glaucoma  Hypertension  TIA (transient ischemic attack)  Dementia  Skin cancer: recent Sx Lt shoulder  MI (myocardial infarction)  Diabetes: Type 2  S/P cataract surgery: both eyes, multiple eye Sx in rteye.  H/O heart surgery: remove benign tumor      MEDICATIONS  (STANDING):  aspirin enteric coated 81 milliGRAM(s) Oral daily  atorvastatin 10 milliGRAM(s) Oral at bedtime  aztreonam  IVPB      aztreonam  IVPB 1000 milliGRAM(s) IV Intermittent every 8 hours  cholecalciferol 1000 Unit(s) Oral daily  dextrose 5%. 1000 milliLiter(s) (50 mL/Hr) IV Continuous <Continuous>  dextrose 50% Injectable 12.5 Gram(s) IV Push once  dextrose 50% Injectable 25 Gram(s) IV Push once  dextrose 50% Injectable 25 Gram(s) IV Push once  diltiazem    milliGRAM(s) Oral daily  docusate sodium 100 milliGRAM(s) Oral three times a day  donepezil 10 milliGRAM(s) Oral at bedtime  enoxaparin Injectable 60 milliGRAM(s) SubCutaneous every 24 hours  insulin lispro (HumaLOG) corrective regimen sliding scale   SubCutaneous three times a day before meals  latanoprost 0.005% Ophthalmic Solution 1 Drop(s) Both EYES at bedtime  levoFLOXacin IVPB      levothyroxine 25 MICROGram(s) Oral daily  memantine 10 milliGRAM(s) Oral two times a day  risperiDONE   Tablet 0.25 milliGRAM(s) Oral daily  risperiDONE   Tablet 0.5 milliGRAM(s) Oral at bedtime  sertraline 100 milliGRAM(s) Oral daily  sodium chloride 0.9%. 1000 milliLiter(s) (150 mL/Hr) IV Continuous <Continuous>    MEDICATIONS  (PRN):  acetaminophen   Tablet 650 milliGRAM(s) Oral every 6 hours PRN For Temp greater than 38.5 C (101.3 F)  dextrose 40% Gel 15 Gram(s) Oral once PRN Blood Glucose LESS THAN 70 milliGRAM(s)/deciliter  glucagon  Injectable 1 milliGRAM(s) IntraMuscular once PRN Glucose LESS THAN 70 milligrams/deciliter  guaiFENesin   Syrup  (Sugar-Free) 200 milliGRAM(s) Oral every 6 hours PRN Cough  ondansetron Injectable 4 milliGRAM(s) IV Push every 6 hours PRN Nausea  senna 2 Tablet(s) Oral at bedtime PRN Constipation      FAMILY HISTORY:  Family history of CKD (chronic kidney disease) (Father)  Family history of CHF (congestive heart failure) (Mother)      SOCIAL HISTORY:  no smoking in recent past    REVIEW OF SYSTEMS:  CONSTITUTIONAL:    c/o fatigue.   c/o fever or chills.  HEENT:  Eyes:  No visual changes.     ENT:  No epistaxis.  No sinus pain.    RESPIRATORY:  c/o cough.  No wheeze.  No hemoptysis.  No shortness of breath.  CARDIOVASCULAR:  No chest pains.  No palpitations. No shortness of breath, No orthopnea or PND.  GASTROINTESTINAL:  No abdominal pain.  No nausea or vomiting.    GENITOURINARY:    No hematuria.    MUSCULOSKELETAL:  No musculoskeletal pain.  No joint swelling.  No arthritis.  NEUROLOGICAL:  No tingling or numbness or weakness.  PSYCHIATRIC:  No confusion  SKIN:  No rashes.    ENDOCRINE:  No unexplained weight loss.  No polydipsia.   HEMATOLOGIC:  No anemia.  No prolonged or excessive bleeding.   ALLERGIC AND IMMUNOLOGIC:  No pruritus.          Vital Signs Last 24 Hrs  T(C): 38.3 (2018 03:30), Max: 39.2 (2018 00:53)  T(F): 100.9 (2018 03:30), Max: 102.6 (2018 00:53)  HR: 63 (2018 07:00) (61 - 143)  BP: 107/39 (:) (100/46 - 129/73)  BP(mean): 57 (:) (57 - 61)  RR: 24 (:) (20 - 25)  SpO2: 95% (:) (93% - 97%)    PHYSICAL EXAM-    Constitutional: The patient appears to be normal, well developed, well nourished and alert and oriented to time, place and person. The patient does not appear acutely ill.     Head: Head is normocephalic and atraumatic.      Neck: No jugular venous distention. No audible carotid bruits. There are strong carotid pulses bilaterally. No JVD.     Cardiovascular: Regular rate and rhythm without S3, S4. ESM 2/6     Respiratory: coarse crackles b/l basal    Abdomen: Soft, nontender, nondistended with positive bowel sounds.      Extremity: No tenderness. No  pitting edema     Neurologic: The patient is alert and oriented.      Skin: No rash, no obvious lesions noted.      Psychiatric: The patient appears to be emotionally stable.      INTERPRETATION OF TELEMETRY: sinus rythm    ECG: Sinus rythm , normal axis, no ST T changes.     I&O's Detail    2018 07:01  -  2018 07:00  --------------------------------------------------------  IN:    IV PiggyBack: 200 mL    Oral Fluid: 120 mL    Sodium Chloride 0.9% IV Bolus: 2000 mL    sodium chloride 0.9%.: 525 mL  Total IN: 2845 mL    OUT:  Total OUT: 0 mL    Total NET: 2845 mL          LABS:                        9.2    11.69 )-----------( 176      ( 2018 06:12 )             27.6     -    138  |  105  |  44<H>  ----------------------------<  71  3.3<L>   |  21<L>  |  1.47<H>    Ca    8.4<L>      2018 15:12  Mg     2.2         TPro  8.3  /  Alb  3.3  /  TBili  1.3<H>  /  DBili  x   /  AST  72<H>  /  ALT  69  /  AlkPhos  108      CARDIAC MARKERS ( 2018 01:57 )  0.046 ng/mL / x     / x     / x     / x          PT/INR - ( 2018 15:12 )   PT: 15.0 sec;   INR: 1.38 ratio         PTT - ( 2018 15:12 )  PTT:26.4 sec  Urinalysis Basic - ( 2018 15:12 )    Color: Yellow / Appearance: Clear / S.020 / pH: x  Gluc: x / Ketone: Negative  / Bili: Small / Urobili: 1 mg/dL   Blood: x / Protein: 30 mg/dL / Nitrite: Negative   Leuk Esterase: Small / RBC: 3-5 /HPF / WBC 6-10   Sq Epi: x / Non Sq Epi: Many / Bacteria: Moderate      I&O's Summary    2018 07:01  -  2018 07:00  --------------------------------------------------------  IN: 2845 mL / OUT: 0 mL / NET: 2845 mL      BNP  RADIOLOGY & ADDITIONAL STUDIES:  < from: Xray Chest 1 View AP/PA. (18 @ 15:58) >  EXAM:  XR CHEST AP OR PA 1V                            PROCEDURE DATE:  2018          INTERPRETATION:  Clinical information: Fever and cough    Portable AP chest radiograph from 1536 hours:    COMPARISON:  May 24, 2016    FINDINGS:  Sternotomy wires are identified. Unremarkable cardiac   mediastinal contours. Patchy airspace opacities at both lung bases. No   pneumothorax or pleural effusion.    IMPRESSION: Suspicion for bilateral lower lobe pneumonia                MARA BOWDEN   This document has been electronically signed. 2018  4:43PM        < end of copied text >  < from: Transthoracic Echocardiogram (18 @ 14:45) >  EXAM:  2D ECHOCARDIOGRAM AD         PROCEDURE DATE:  2018        INTERPRETATION:  Transthoracic Echocardiography Report (TTE)     Demographics     Patient name      SAM HICKEY              Age           89 year(s)     Med Rec #         [id number]              Gender        Female     Account #         [id number]                Date of Birth 10/22/1927     Interpreting      Venugopal Palla, MD    Room Number   0035   Physician     Referring         Janell Bahenau             Sonographer   Steve,   Physician         Arianne DIALLO                  [facility]     Date of study     2018 11:10 AM     Height            62.99 in               Weight        147.71 pounds    Type of Study:     TTE procedure: 2D echocardiogram AD     BP: 125/75 mmHg     Study Location: [facility] Quality: Technically Difficullt    Indications   1) R06.00 - Dyspnea    M-Mode Measurements (cm)     LVEDd: 4.65 cm            LVESd: 3.29 cm   IVSEd: 0.87 cm   LVPWd: 1 cm               AO Root Dimension: 3.5 cm                             ACS: 1 cm                             LA: 4.1 cm                             LVOT: 2.1 cm    Doppler Measurements:     AV Velocity:278 cm/s                 MV Peak E-Wave: 115 cm/s   AV Peak Gradient: 30.91 mmHg      MV Peak A-Wave: 65.2 cm/s   AV Mean Gradient: 16 mmHg            MV E/A Ratio: 1.76 %   AV Area (Continuity):1.76 cm^2       MV Peak Gradient: 5.29 mmHg   TR Velocity:327 cm/s   TR Gradient:42.7716 mmHg   Estimated RAP:10 mmHg   RVSP:47 mmHg     Findings     Mitral Valve   Normal appearing mitral valve structure and function.   Moderate (2+) mitral regurgitation is present.   Mild mitral annular calcification is present.     Aortic Valve   Significant fibrocalcific changes noted to the aortic valve leaflets with   restriction in leaflet excursion. Peak transaortic gradient is 30 mmHg;   this finding is consistent with mild aortic stenosis.     Tricuspid Valve   Normal appearing tricuspid valve structure and function.   Moderate (2+) tricuspid valve regurgitation is present.   Moderate pulmonary hypertension.     Pulmonic Valve   Normal appearing pulmonic valve structure and function.   Mild pulmonic valvular regurgitation (1+) is present.     Left Atrium   The left atrium is mildly dilated.     Left Ventricle   The left ventricle is normal in size, wall thickness, wall motion and   contractility.   Estimated left ventricular ejection fraction is 55-60 %.     Right Atrium   Normal appearing right atrium.     Right Ventricle   Normal appearing right ventricle structure and function.     Pericardial Effusion   No evidence of pericardial effusion.     Pleural Effusion   No evidence of pleural effusion.     Miscellaneous   All visualized extra cardiac structures appears to be normal.     Impression     Summary     Normal appearing mitral valve structure and function.   Moderate (2+) mitral regurgitation is present.   Mild mitral annular calcification is present.   Significant fibrocalcific changes noted to the aortic valve leaflets with   restriction in leaflet excursion. Peak transaortic gradient is 30 mmHg;   this finding is consistent with mild aortic stenosis.   Normal appearing tricuspid valve structure and function.   Moderate (2+) tricuspid valve regurgitation is present.   Moderate pulmonary hypertension.   Normal appearing pulmonic valve structure and function.   Mild pulmonic valvular regurgitation (1+) is present.   The left ventricle is normal in size, wall thickness, wall motion and   contractility.   Estimated left ventricular ejection fraction is 55-60 %.   The left atrium is mildly dilated.   Normal appearing right ventricle structure and function.     Signature     ----------------------------------------------------------------   Electronically signed by Venugopal Palla, MD(Interpreting   physician) on 2018 05:53 PM   ----------------------------------------------------------------      < end of copied text >

## 2018-07-26 NOTE — DIETITIAN INITIAL EVALUATION ADULT. - OTHER INFO
pt seen as LOS; 89yo female with PMH of dementia, TIA, HTN, DMII, HLD p/w confusion with cough and fever.  FOund to be dyspneic and hypoxic.  Pt found to have sepsis 2/2 HCAP, Afib with RVR.  Pt had diarrhea, now resolved.  C.Diff (-).  Pt DNR.  Upon visit, pt eating breakfast.  Most of tray consumed.  RN reports pt eating most of meals; EMR shows pt sometimes only eating 25% of meals. Possibly pt not meeting nutr needs, unable to accurately determine at this time. Pt only at NH short period of time PTA.  Pt wt stable per EMR.  NFPE shows mild temporal muscle wasting.  RECOMMENDATIONS: 1) add ensure pudding TID to aid with optimizing PO intake 2) add MVI with minerals daily 3) document all PO intake in flowsheet 4) biweekly wt checks

## 2018-07-26 NOTE — DIETITIAN INITIAL EVALUATION ADULT. - PERTINENT LABORATORY DATA
7/26: BUN 48 (H), GFR 47 (L). 7/24: feritin 1160 (H), Fe 25 (L), B12 1067.  7/23: alb 2.5 (L), AST 79 (H), A1C 6.3

## 2018-07-26 NOTE — PROGRESS NOTE ADULT - PROVIDER SPECIALTY LIST ADULT
Cardiology
Cardiology
Hospitalist
Infectious Disease

## 2018-07-27 ENCOUNTER — TRANSCRIPTION ENCOUNTER (OUTPATIENT)
Age: 83
End: 2018-07-27

## 2018-07-27 VITALS — WEIGHT: 143.52 LBS

## 2018-07-27 LAB
ANION GAP SERPL CALC-SCNC: 9 MMOL/L — SIGNIFICANT CHANGE UP (ref 5–17)
BUN SERPL-MCNC: 50 MG/DL — HIGH (ref 7–23)
CALCIUM SERPL-MCNC: 9.3 MG/DL — SIGNIFICANT CHANGE UP (ref 8.5–10.1)
CHLORIDE SERPL-SCNC: 106 MMOL/L — SIGNIFICANT CHANGE UP (ref 96–108)
CO2 SERPL-SCNC: 23 MMOL/L — SIGNIFICANT CHANGE UP (ref 22–31)
CREAT SERPL-MCNC: 1.13 MG/DL — SIGNIFICANT CHANGE UP (ref 0.5–1.3)
CULTURE RESULTS: SIGNIFICANT CHANGE UP
CULTURE RESULTS: SIGNIFICANT CHANGE UP
GLUCOSE BLDC GLUCOMTR-MCNC: 157 MG/DL — HIGH (ref 70–99)
GLUCOSE BLDC GLUCOMTR-MCNC: 262 MG/DL — HIGH (ref 70–99)
GLUCOSE BLDC GLUCOMTR-MCNC: 275 MG/DL — HIGH (ref 70–99)
GLUCOSE SERPL-MCNC: 147 MG/DL — HIGH (ref 70–99)
HCT VFR BLD CALC: 34.4 % — LOW (ref 34.5–45)
HGB BLD-MCNC: 11.2 G/DL — LOW (ref 11.5–15.5)
INR BLD: 2.02 RATIO — HIGH (ref 0.88–1.16)
MAGNESIUM SERPL-MCNC: 2 MG/DL — SIGNIFICANT CHANGE UP (ref 1.6–2.6)
MCHC RBC-ENTMCNC: 31.9 PG — SIGNIFICANT CHANGE UP (ref 27–34)
MCHC RBC-ENTMCNC: 32.6 GM/DL — SIGNIFICANT CHANGE UP (ref 32–36)
MCV RBC AUTO: 98 FL — SIGNIFICANT CHANGE UP (ref 80–100)
NRBC # BLD: 0 /100 WBCS — SIGNIFICANT CHANGE UP (ref 0–0)
PLATELET # BLD AUTO: 278 K/UL — SIGNIFICANT CHANGE UP (ref 150–400)
POTASSIUM SERPL-MCNC: 3.8 MMOL/L — SIGNIFICANT CHANGE UP (ref 3.5–5.3)
POTASSIUM SERPL-SCNC: 3.8 MMOL/L — SIGNIFICANT CHANGE UP (ref 3.5–5.3)
PROTHROM AB SERPL-ACNC: 22.1 SEC — HIGH (ref 9.8–12.7)
RBC # BLD: 3.51 M/UL — LOW (ref 3.8–5.2)
RBC # FLD: 14.2 % — SIGNIFICANT CHANGE UP (ref 10.3–14.5)
SODIUM SERPL-SCNC: 138 MMOL/L — SIGNIFICANT CHANGE UP (ref 135–145)
SPECIMEN SOURCE: SIGNIFICANT CHANGE UP
SPECIMEN SOURCE: SIGNIFICANT CHANGE UP
WBC # BLD: 9.1 K/UL — SIGNIFICANT CHANGE UP (ref 3.8–10.5)
WBC # FLD AUTO: 9.1 K/UL — SIGNIFICANT CHANGE UP (ref 3.8–10.5)

## 2018-07-27 RX ORDER — MAGNESIUM HYDROXIDE 400 MG/1
15 TABLET, CHEWABLE ORAL
Qty: 0 | Refills: 0 | COMMUNITY

## 2018-07-27 RX ORDER — FUROSEMIDE 40 MG
1 TABLET ORAL
Qty: 30 | Refills: 0 | OUTPATIENT
Start: 2018-07-27 | End: 2018-08-25

## 2018-07-27 RX ORDER — ALBUTEROL 90 UG/1
2 AEROSOL, METERED ORAL EVERY 6 HOURS
Qty: 0 | Refills: 0 | Status: DISCONTINUED | OUTPATIENT
Start: 2018-07-27 | End: 2018-07-27

## 2018-07-27 RX ORDER — CEFUROXIME AXETIL 250 MG
1 TABLET ORAL
Qty: 12 | Refills: 0 | OUTPATIENT
Start: 2018-07-27 | End: 2018-08-01

## 2018-07-27 RX ORDER — ALBUTEROL 90 UG/1
2 AEROSOL, METERED ORAL
Qty: 1 | Refills: 0 | OUTPATIENT
Start: 2018-07-27 | End: 2018-08-25

## 2018-07-27 RX ORDER — WARFARIN SODIUM 2.5 MG/1
1 TABLET ORAL
Qty: 30 | Refills: 0 | OUTPATIENT
Start: 2018-07-27 | End: 2018-08-25

## 2018-07-27 RX ORDER — HYDROCORTISONE 1 %
1 OINTMENT (GRAM) TOPICAL
Qty: 1 | Refills: 0 | OUTPATIENT
Start: 2018-07-27 | End: 2018-08-25

## 2018-07-27 RX ORDER — SENNA PLUS 8.6 MG/1
2 TABLET ORAL
Qty: 0 | Refills: 0 | COMMUNITY
Start: 2018-07-27

## 2018-07-27 RX ORDER — FUROSEMIDE 40 MG
40 TABLET ORAL DAILY
Qty: 0 | Refills: 0 | Status: DISCONTINUED | OUTPATIENT
Start: 2018-07-27 | End: 2018-07-27

## 2018-07-27 RX ADMIN — MEMANTINE HYDROCHLORIDE 10 MILLIGRAM(S): 10 TABLET ORAL at 05:57

## 2018-07-27 RX ADMIN — Medication 250 MILLIGRAM(S): at 05:57

## 2018-07-27 RX ADMIN — Medication 1: at 08:37

## 2018-07-27 RX ADMIN — Medication 100 MILLIGRAM(S): at 05:57

## 2018-07-27 RX ADMIN — Medication 120 MILLIGRAM(S): at 05:57

## 2018-07-27 RX ADMIN — Medication 25 MICROGRAM(S): at 05:57

## 2018-07-27 RX ADMIN — ENOXAPARIN SODIUM 60 MILLIGRAM(S): 100 INJECTION SUBCUTANEOUS at 05:57

## 2018-07-27 RX ADMIN — RISPERIDONE 0.25 MILLIGRAM(S): 4 TABLET ORAL at 12:04

## 2018-07-27 RX ADMIN — SERTRALINE 100 MILLIGRAM(S): 25 TABLET, FILM COATED ORAL at 12:04

## 2018-07-27 RX ADMIN — ALBUTEROL 2.5 MILLIGRAM(S): 90 AEROSOL, METERED ORAL at 01:18

## 2018-07-27 RX ADMIN — Medication 1 APPLICATION(S): at 05:58

## 2018-07-27 RX ADMIN — Medication 3: at 12:12

## 2018-07-27 RX ADMIN — ALBUTEROL 2.5 MILLIGRAM(S): 90 AEROSOL, METERED ORAL at 09:22

## 2018-07-27 RX ADMIN — Medication 81 MILLIGRAM(S): at 12:05

## 2018-07-27 RX ADMIN — Medication 1000 UNIT(S): at 12:05

## 2018-07-27 NOTE — DISCHARGE NOTE ADULT - HOSPITAL COURSE
89 y/o F PMHx significant for dementia, TIA, HTN, diabetes mellitus type 2, hyperlipidemia, who was BIBA from [facility] for further evaluation of increased confusion which began last night (7/21) associated with cough, and low grade fever today (7/22). At [facility] the patient was found to be increasingly dyspneic and hypoxic (per EMS SpO2 90% PTA). Labs => WBC 17.17, Hgb/Hct 10.9/32.7, K 3.3, BUN/Cr 44/1.47. CT Head => moderate anterior periventricular white matter ischemia. CXR revealed bilateral lower lobe infiltrates. In the ED the patient was found to be in AFib w/ RVR.    Patient transferred to CCU for closer monitoring. Patient converted to NSR, but was started on full dose AC with bridging to coumadin- INR therapeutic at discharge. Patient appeared to be in CHF, possibly 2/2 to fluid resuscitation for sepsis. Evaluated by Cardiology and started on lasix with improvement of symptoms. Patient had course of diarrhea- c diff negative, resolved on its own. Patient was placed on IV antibiotics for PNA with good response and was transitioned to PO antibiotics. Patient evaluated by PT, recommended to go back to [facility] with PT services. Patient evaluated for need for home O2- O2 sat 95% on RA at rest and 93% on RA during ambulation- no home O2 needed at this time. Patient medically stable for discharge at this time with close follow up with PCP and Cardiology.    Physical Exam on day of discharge:  Constitutional: NAD, awake and alert, well-developed  HEENT: PERR, EOMI, Normal Hearing, MMM  Neck: Soft and supple, No LAD, No JVD  Respiratory: CTA B/L  Cardiovascular: S1 and S2, + murmur  Gastrointestinal: Bowel Sounds present, soft, nontender, nondistended, no guarding, no rebound  Extremities: No peripheral edema  Vascular: 2+ peripheral pulses  Neurological: A/O x 3, no focal deficits  Musculoskeletal: 5/5 strength b/l upper and lower extremities    Plan discussed with patient and daughter. LUPIS aware of patient's condition  Total time spent on discharge: 57 minutes

## 2018-07-27 NOTE — DISCHARGE NOTE ADULT - SECONDARY DIAGNOSIS.
Pneumonia Dementia Diabetes Essential hypertension CHF (congestive heart failure) Dyslipidemia Atrial fibrillation, unspecified type

## 2018-07-27 NOTE — DISCHARGE NOTE ADULT - NSTOBACCOHOTLINE_GEN_A_CS
[facility] Smokers Quitline ([id number]) [facility] Smokers Quitline ([phone])

## 2018-07-27 NOTE — DISCHARGE NOTE ADULT - CONDITIONS AT DISCHARGE
Should you become short of breath or your symptoms become worse, please contact your PCP or return to the emergency room

## 2018-07-27 NOTE — DISCHARGE NOTE ADULT - NSTOBACCOSMKCESSPRO_GEN_A_CS
Referred to [facility] for Tobacco Control... Referred to [facility] for Tobacco Control...

## 2018-07-27 NOTE — DISCHARGE NOTE ADULT - CARE PLAN
Principal Discharge DX:	Sepsis, due to unspecified organism  Secondary Diagnosis:	Pneumonia  Secondary Diagnosis:	Dementia  Secondary Diagnosis:	Diabetes  Secondary Diagnosis:	Essential hypertension  Secondary Diagnosis:	CHF (congestive heart failure)  Secondary Diagnosis:	Dyslipidemia Principal Discharge DX:	Sepsis, due to unspecified organism  Secondary Diagnosis:	Pneumonia  Secondary Diagnosis:	Dementia  Secondary Diagnosis:	Diabetes  Secondary Diagnosis:	Essential hypertension  Secondary Diagnosis:	CHF (congestive heart failure)  Secondary Diagnosis:	Atrial fibrillation, unspecified type Principal Discharge DX:	Sepsis, due to unspecified organism  Goal:	resolution  Assessment and plan of treatment:	-You had a very severe infection due to Pneumonia  -You were placed on IV antibiotics  -Please complete full course of antibiotics (6 more days)- ceftin and doxycycline  Secondary Diagnosis:	Pneumonia  Assessment and plan of treatment:	-Please complete full course of antibiotics (6 more days)- ceftin and doxycycline  -Continue albuterol inhaler as needed  -Follow up with your PCP within 1 week of discharge  Secondary Diagnosis:	Dementia  Assessment and plan of treatment:	-Continue all home medications  Secondary Diagnosis:	Diabetes  Assessment and plan of treatment:	-Continue prandin  Secondary Diagnosis:	Essential hypertension  Assessment and plan of treatment:	-Continue home medications  Secondary Diagnosis:	CHF (congestive heart failure)  Assessment and plan of treatment:	-Continue lasix 40mg daily  Secondary Diagnosis:	Atrial fibrillation, unspecified type  Assessment and plan of treatment:	-Continue coumadin 5mg daily at bedtime  -You will need to have your PT/INR checked on Monday- 7/30/18 at the facility  -Continue diltiazem  -Please follow up with Cardiology within 2 weeks of discharge

## 2018-07-27 NOTE — DISCHARGE NOTE ADULT - MEDICATION SUMMARY - MEDICATIONS TO STOP TAKING
I will STOP taking the medications listed below when I get home from the hospital:    Milk of Magnesia 8% oral suspension  -- 15 milliliter(s) by mouth once a day (at bedtime)

## 2018-07-27 NOTE — DISCHARGE NOTE ADULT - ADDITIONAL INSTRUCTIONS
-You will need a repeat PT/INR on Monday- 7/30/18  -Follow up with PCP at facility within 1 week  -Follow up with Cardiology within 2 weeks of discharge

## 2018-07-27 NOTE — DISCHARGE NOTE ADULT - PATIENT PORTAL LINK FT
You can access the [facility], offered by [facility], by registering with the following website: [email or web address]

## 2018-07-27 NOTE — DISCHARGE NOTE ADULT - MEDICATION SUMMARY - MEDICATIONS TO TAKE
I will START or STAY ON the medications listed below when I get home from the hospital:    acetaminophen 325 mg oral tablet  -- 2 tab(s) by mouth every 4 hours, As Needed  -- Indication: For Pain    aspirin 81 mg oral tablet  -- 1 tab(s) by mouth once a day  -- Indication: For Atrial fibrillation, unspecified type    dilTIAZem 120 mg/24 hours oral tablet, extended release  -- 1 tab(s) by mouth once a day  -- Indication: For Atrial fibrillation, unspecified type    Coumadin 5 mg oral tablet  -- 1 tab(s) by mouth once a day   -- Do not take this drug if you are pregnant.  It is very important that you take or use this exactly as directed.  Do not skip doses or discontinue unless directed by your doctor.  Obtain medical advice before taking any non-prescription drugs as some may affect the action of this medication.    -- Indication: For Atrial fibrillation, unspecified type    sertraline 100 mg oral tablet  -- 1 tab(s) by mouth once a day  -- Indication: For Dementia    Prandin 1 mg oral tablet  -- 1 tab(s) by mouth once a day  -- Indication: For Diabetes    Lipitor 10 mg oral tablet  -- 1 tab(s) by mouth once a day (at bedtime)  -- Indication: For hld    doxycycline monohydrate 100 mg oral capsule  -- 1 cap(s) by mouth every 12 hours  -- Indication: For Pneumonia    RisperDAL 0.5 mg oral tablet  -- 1 tab(s) by mouth once a day (at bedtime)  -- Indication: For Dementia    RisperDAL 0.25 mg oral tablet  -- 1 tab(s) by mouth once a day  -- Indication: For Dementia    albuterol 90 mcg/inh inhalation aerosol  -- 2 puff(s) inhaled every 6 hours, As needed, Shortness of Breath and/or Wheezing  -- Indication: For Pneumonia    cefuroxime 250 mg oral tablet  -- 1 tab(s) by mouth every 12 hours  -- Indication: For Pneumonia    Aricept 10 mg oral tablet  -- 1 tab(s) by mouth once a day (at bedtime)  -- Indication: For Dementia    hydrocortisone 0.5% topical cream  -- 1 application on skin 2 times a day  -- Indication: For rash    furosemide 40 mg oral tablet  -- 1 tab(s) by mouth once a day  -- Indication: For CHF (congestive heart failure)    guaiFENesin 100 mg/5 mL oral liquid  -- 10 milliliter(s) by mouth every 6 hours, As needed, Cough  -- Indication: For Pneumonia    senna oral tablet  -- 2 tab(s) by mouth once a day (at bedtime), As needed, Constipation  -- Indication: For Constipation    Colace 100 mg oral capsule  -- 1 cap(s) by mouth 3 times a day  -- Indication: For Constipation    memantine 10 mg oral tablet  -- 1 tab(s) by mouth 2 times a day  -- Indication: For Dementia    latanoprost 0.005% ophthalmic solution  -- 1 drop(s) to each affected eye once a day (in the evening)  -- Indication: For glaucoma    levothyroxine 25 mcg (0.025 mg) oral tablet  -- 1 tab(s) by mouth once a day  -- Indication: For hypothyroidism    Vitamin D3 1000 intl units oral tablet  -- 1 tab(s) by mouth once a day  -- Indication: For vitamin

## 2018-07-27 NOTE — DISCHARGE NOTE ADULT - PLAN OF CARE
resolution -You had a very severe infection due to Pneumonia  -You were placed on IV antibiotics  -Please complete full course of antibiotics (6 more days)- ceftin and doxycycline -Please complete full course of antibiotics (6 more days)- ceftin and doxycycline  -Continue albuterol inhaler as needed  -Follow up with your PCP within 1 week of discharge -Continue all home medications -Continue prandin -Continue home medications -Continue lasix 40mg daily -Continue coumadin 5mg daily at bedtime  -You will need to have your PT/INR checked on Monday- 7/30/18 at the facility  -Continue diltiazem  -Please follow up with Cardiology within 2 weeks of discharge

## 2018-07-31 DIAGNOSIS — A41.9 SEPSIS, UNSPECIFIED ORGANISM: ICD-10-CM

## 2018-07-31 DIAGNOSIS — I25.10 ATHEROSCLEROTIC HEART DISEASE OF NATIVE CORONARY ARTERY WITHOUT ANGINA PECTORIS: ICD-10-CM

## 2018-07-31 DIAGNOSIS — R06.02 SHORTNESS OF BREATH: ICD-10-CM

## 2018-07-31 DIAGNOSIS — Z86.73 PERSONAL HISTORY OF TRANSIENT ISCHEMIC ATTACK (TIA), AND CEREBRAL INFARCTION WITHOUT RESIDUAL DEFICITS: ICD-10-CM

## 2018-07-31 DIAGNOSIS — E11.9 TYPE 2 DIABETES MELLITUS WITHOUT COMPLICATIONS: ICD-10-CM

## 2018-07-31 DIAGNOSIS — Z88.0 ALLERGY STATUS TO PENICILLIN: ICD-10-CM

## 2018-07-31 DIAGNOSIS — I67.82 CEREBRAL ISCHEMIA: ICD-10-CM

## 2018-07-31 DIAGNOSIS — J15.6 PNEUMONIA DUE TO OTHER GRAM-NEGATIVE BACTERIA: ICD-10-CM

## 2018-07-31 DIAGNOSIS — F03.90 UNSPECIFIED DEMENTIA WITHOUT BEHAVIORAL DISTURBANCE: ICD-10-CM

## 2018-07-31 DIAGNOSIS — H40.9 UNSPECIFIED GLAUCOMA: ICD-10-CM

## 2018-07-31 DIAGNOSIS — I35.0 NONRHEUMATIC AORTIC (VALVE) STENOSIS: ICD-10-CM

## 2018-07-31 DIAGNOSIS — E78.5 HYPERLIPIDEMIA, UNSPECIFIED: ICD-10-CM

## 2018-07-31 DIAGNOSIS — I50.33 ACUTE ON CHRONIC DIASTOLIC (CONGESTIVE) HEART FAILURE: ICD-10-CM

## 2018-07-31 DIAGNOSIS — Z79.82 LONG TERM (CURRENT) USE OF ASPIRIN: ICD-10-CM

## 2018-07-31 DIAGNOSIS — D64.9 ANEMIA, UNSPECIFIED: ICD-10-CM

## 2018-07-31 DIAGNOSIS — I11.0 HYPERTENSIVE HEART DISEASE WITH HEART FAILURE: ICD-10-CM

## 2018-07-31 DIAGNOSIS — J96.91 RESPIRATORY FAILURE, UNSPECIFIED WITH HYPOXIA: ICD-10-CM

## 2018-07-31 DIAGNOSIS — I48.91 UNSPECIFIED ATRIAL FIBRILLATION: ICD-10-CM

## 2018-07-31 DIAGNOSIS — I25.2 OLD MYOCARDIAL INFARCTION: ICD-10-CM

## 2018-07-31 DIAGNOSIS — R19.7 DIARRHEA, UNSPECIFIED: ICD-10-CM

## 2018-07-31 DIAGNOSIS — Z85.828 PERSONAL HISTORY OF OTHER MALIGNANT NEOPLASM OF SKIN: ICD-10-CM

## 2018-07-31 DIAGNOSIS — G93.41 METABOLIC ENCEPHALOPATHY: ICD-10-CM

## 2020-09-15 NOTE — ED ADULT NURSE REASSESSMENT NOTE - BREATH SOUNDS, LEFT
----- Message from Remy Day MD sent at 9/13/2020  4:03 PM EDT -----  Pap ASCUS / HPV. Appt to discuss if new dx; otherwise colpo now. diminished

## 2025-04-21 NOTE — PATIENT PROFILE ADULT. - PURPOSEFUL PROACTIVE ROUNDING
[Fever] : no fever [Chills] : no chills [Eye Pain] : no eye pain [Red Eyes] : eyes not red [Nosebleeds] : no nosebleeds [Nasal Discharge] : no nasal discharge [Chest Pain] : no chest pain [Palpitations] : no palpitations [Shortness Of Breath] : no shortness of breath Patient [Cough] : no cough [Wheezing] : no wheezing [SOB on Exertion] : shortness of breath during exertion [Heartburn] : no heartburn [Joint Pain] : joint pain [Skin Lesions] : skin lesion